# Patient Record
Sex: MALE | Race: WHITE | Employment: OTHER | URBAN - METROPOLITAN AREA
[De-identification: names, ages, dates, MRNs, and addresses within clinical notes are randomized per-mention and may not be internally consistent; named-entity substitution may affect disease eponyms.]

---

## 2017-12-03 PROBLEM — R06.00 DYSPNEA: Status: ACTIVE | Noted: 2017-12-03

## 2017-12-03 PROBLEM — R06.02 SHORTNESS OF BREATH: Status: ACTIVE | Noted: 2017-12-03

## 2017-12-03 PROBLEM — R06.02 SHORT OF BREATH ON EXERTION: Status: ACTIVE | Noted: 2017-12-03

## 2018-04-30 ENCOUNTER — OFFICE VISIT (OUTPATIENT)
Dept: CARDIOLOGY CLINIC | Age: 83
End: 2018-04-30
Payer: MEDICARE

## 2018-04-30 VITALS
HEART RATE: 68 BPM | SYSTOLIC BLOOD PRESSURE: 124 MMHG | RESPIRATION RATE: 16 BRPM | HEIGHT: 68 IN | WEIGHT: 149.3 LBS | DIASTOLIC BLOOD PRESSURE: 70 MMHG | BODY MASS INDEX: 22.63 KG/M2

## 2018-04-30 DIAGNOSIS — I25.10 CORONARY ARTERY DISEASE INVOLVING NATIVE CORONARY ARTERY OF NATIVE HEART WITHOUT ANGINA PECTORIS: ICD-10-CM

## 2018-04-30 DIAGNOSIS — I10 HYPERTENSION, UNSPECIFIED TYPE: ICD-10-CM

## 2018-04-30 DIAGNOSIS — I50.42 CHRONIC COMBINED SYSTOLIC AND DIASTOLIC CHF (CONGESTIVE HEART FAILURE) (HCC): Primary | ICD-10-CM

## 2018-04-30 DIAGNOSIS — I38 VALVULAR HEART DISEASE: ICD-10-CM

## 2018-04-30 DIAGNOSIS — I10 ESSENTIAL HYPERTENSION: ICD-10-CM

## 2018-04-30 PROCEDURE — 1036F TOBACCO NON-USER: CPT | Performed by: INTERNAL MEDICINE

## 2018-04-30 PROCEDURE — 93000 ELECTROCARDIOGRAM COMPLETE: CPT | Performed by: INTERNAL MEDICINE

## 2018-04-30 PROCEDURE — 1123F ACP DISCUSS/DSCN MKR DOCD: CPT | Performed by: INTERNAL MEDICINE

## 2018-04-30 PROCEDURE — G8598 ASA/ANTIPLAT THER USED: HCPCS | Performed by: INTERNAL MEDICINE

## 2018-04-30 PROCEDURE — 4040F PNEUMOC VAC/ADMIN/RCVD: CPT | Performed by: INTERNAL MEDICINE

## 2018-04-30 PROCEDURE — G8427 DOCREV CUR MEDS BY ELIG CLIN: HCPCS | Performed by: INTERNAL MEDICINE

## 2018-04-30 PROCEDURE — G8420 CALC BMI NORM PARAMETERS: HCPCS | Performed by: INTERNAL MEDICINE

## 2018-04-30 PROCEDURE — 99214 OFFICE O/P EST MOD 30 MIN: CPT | Performed by: INTERNAL MEDICINE

## 2018-07-12 ENCOUNTER — APPOINTMENT (OUTPATIENT)
Dept: GENERAL RADIOLOGY | Age: 83
DRG: 683 | End: 2018-07-12
Payer: MEDICARE

## 2018-07-12 ENCOUNTER — HOSPITAL ENCOUNTER (INPATIENT)
Age: 83
LOS: 6 days | Discharge: HOME | DRG: 683 | End: 2018-07-18
Attending: GENERAL PRACTICE | Admitting: GENERAL PRACTICE
Payer: MEDICARE

## 2018-07-12 DIAGNOSIS — R07.9 CHEST PAIN, UNSPECIFIED TYPE: ICD-10-CM

## 2018-07-12 DIAGNOSIS — N17.9 AKI (ACUTE KIDNEY INJURY) (HCC): Primary | ICD-10-CM

## 2018-07-12 DIAGNOSIS — I50.9 ACUTE CONGESTIVE HEART FAILURE, UNSPECIFIED HEART FAILURE TYPE (HCC): ICD-10-CM

## 2018-07-12 LAB
ALBUMIN SERPL-MCNC: 4 G/DL (ref 3.5–5.2)
ALP BLD-CCNC: 56 U/L (ref 40–129)
ALT SERPL-CCNC: 12 U/L (ref 0–40)
ANION GAP SERPL CALCULATED.3IONS-SCNC: 19 MMOL/L (ref 7–16)
APTT: 28.5 SEC (ref 24.5–35.1)
AST SERPL-CCNC: 16 U/L (ref 0–39)
BACTERIA: NORMAL /HPF
BASOPHILS ABSOLUTE: 0.03 E9/L (ref 0–0.2)
BASOPHILS RELATIVE PERCENT: 0.6 % (ref 0–2)
BILIRUB SERPL-MCNC: 1.2 MG/DL (ref 0–1.2)
BILIRUBIN URINE: NEGATIVE
BLOOD, URINE: ABNORMAL
BUN BLDV-MCNC: 57 MG/DL (ref 8–23)
CALCIUM SERPL-MCNC: 9.9 MG/DL (ref 8.6–10.2)
CHLORIDE BLD-SCNC: 100 MMOL/L (ref 98–107)
CHLORIDE URINE RANDOM: 60 MMOL/L
CLARITY: CLEAR
CO2: 20 MMOL/L (ref 22–29)
COLOR: YELLOW
CREAT SERPL-MCNC: 3 MG/DL (ref 0.7–1.2)
CREATININE URINE: 35 MG/DL (ref 40–278)
EKG ATRIAL RATE: 77 BPM
EKG P AXIS: 63 DEGREES
EKG P-R INTERVAL: 228 MS
EKG Q-T INTERVAL: 396 MS
EKG QRS DURATION: 104 MS
EKG QTC CALCULATION (BAZETT): 448 MS
EKG R AXIS: -50 DEGREES
EKG T AXIS: 87 DEGREES
EKG VENTRICULAR RATE: 77 BPM
EOSINOPHILS ABSOLUTE: 0.12 E9/L (ref 0.05–0.5)
EOSINOPHILS RELATIVE PERCENT: 2.2 % (ref 0–6)
GFR AFRICAN AMERICAN: 24
GFR NON-AFRICAN AMERICAN: 19 ML/MIN/1.73
GLUCOSE BLD-MCNC: 105 MG/DL (ref 74–109)
GLUCOSE URINE: NEGATIVE MG/DL
HCT VFR BLD CALC: 36.2 % (ref 37–54)
HEMOGLOBIN: 12.3 G/DL (ref 12.5–16.5)
IMMATURE GRANULOCYTES #: 0.02 E9/L
IMMATURE GRANULOCYTES %: 0.4 % (ref 0–5)
INR BLD: 1
KETONES, URINE: NEGATIVE MG/DL
LEUKOCYTE ESTERASE, URINE: NEGATIVE
LYMPHOCYTES ABSOLUTE: 1.48 E9/L (ref 1.5–4)
LYMPHOCYTES RELATIVE PERCENT: 27.6 % (ref 20–42)
MAGNESIUM: 2.4 MG/DL (ref 1.6–2.6)
MCH RBC QN AUTO: 29.5 PG (ref 26–35)
MCHC RBC AUTO-ENTMCNC: 34 % (ref 32–34.5)
MCV RBC AUTO: 86.8 FL (ref 80–99.9)
MONOCYTES ABSOLUTE: 0.38 E9/L (ref 0.1–0.95)
MONOCYTES RELATIVE PERCENT: 7.1 % (ref 2–12)
NEUTROPHILS ABSOLUTE: 3.33 E9/L (ref 1.8–7.3)
NEUTROPHILS RELATIVE PERCENT: 62.1 % (ref 43–80)
NITRITE, URINE: NEGATIVE
PDW BLD-RTO: 11.9 FL (ref 11.5–15)
PH UA: 7.5 (ref 5–9)
PLATELET # BLD: 206 E9/L (ref 130–450)
PMV BLD AUTO: 10.6 FL (ref 7–12)
POTASSIUM SERPL-SCNC: 5.3 MMOL/L (ref 3.5–5)
POTASSIUM SERPL-SCNC: 5.4 MMOL/L (ref 3.5–5)
POTASSIUM, UR: 33.8 MMOL/L
PRO-BNP: 1104 PG/ML (ref 0–450)
PROTEIN UA: NEGATIVE MG/DL
PROTHROMBIN TIME: 11.5 SEC (ref 9.3–12.4)
RBC # BLD: 4.17 E12/L (ref 3.8–5.8)
RBC UA: NORMAL /HPF (ref 0–2)
SODIUM BLD-SCNC: 139 MMOL/L (ref 132–146)
SODIUM URINE: 71 MMOL/L
SPECIFIC GRAVITY UA: 1.01 (ref 1–1.03)
TOTAL PROTEIN: 7.3 G/DL (ref 6.4–8.3)
TROPONIN: 0.05 NG/ML (ref 0–0.03)
UROBILINOGEN, URINE: 0.2 E.U./DL
WBC # BLD: 5.4 E9/L (ref 4.5–11.5)
WBC UA: NORMAL /HPF (ref 0–5)

## 2018-07-12 PROCEDURE — 93005 ELECTROCARDIOGRAM TRACING: CPT | Performed by: EMERGENCY MEDICINE

## 2018-07-12 PROCEDURE — 80053 COMPREHEN METABOLIC PANEL: CPT

## 2018-07-12 PROCEDURE — 83735 ASSAY OF MAGNESIUM: CPT

## 2018-07-12 PROCEDURE — 81001 URINALYSIS AUTO W/SCOPE: CPT

## 2018-07-12 PROCEDURE — 85025 COMPLETE CBC W/AUTO DIFF WBC: CPT

## 2018-07-12 PROCEDURE — 83880 ASSAY OF NATRIURETIC PEPTIDE: CPT

## 2018-07-12 PROCEDURE — 84132 ASSAY OF SERUM POTASSIUM: CPT

## 2018-07-12 PROCEDURE — 99285 EMERGENCY DEPT VISIT HI MDM: CPT

## 2018-07-12 PROCEDURE — 84133 ASSAY OF URINE POTASSIUM: CPT

## 2018-07-12 PROCEDURE — 82436 ASSAY OF URINE CHLORIDE: CPT

## 2018-07-12 PROCEDURE — 36415 COLL VENOUS BLD VENIPUNCTURE: CPT

## 2018-07-12 PROCEDURE — 84484 ASSAY OF TROPONIN QUANT: CPT

## 2018-07-12 PROCEDURE — 85730 THROMBOPLASTIN TIME PARTIAL: CPT

## 2018-07-12 PROCEDURE — 6360000002 HC RX W HCPCS: Performed by: GENERAL PRACTICE

## 2018-07-12 PROCEDURE — 6370000000 HC RX 637 (ALT 250 FOR IP): Performed by: EMERGENCY MEDICINE

## 2018-07-12 PROCEDURE — 99223 1ST HOSP IP/OBS HIGH 75: CPT | Performed by: INTERNAL MEDICINE

## 2018-07-12 PROCEDURE — 2580000003 HC RX 258: Performed by: EMERGENCY MEDICINE

## 2018-07-12 PROCEDURE — 84300 ASSAY OF URINE SODIUM: CPT

## 2018-07-12 PROCEDURE — 2140000000 HC CCU INTERMEDIATE R&B

## 2018-07-12 PROCEDURE — 82570 ASSAY OF URINE CREATININE: CPT

## 2018-07-12 PROCEDURE — 71045 X-RAY EXAM CHEST 1 VIEW: CPT

## 2018-07-12 PROCEDURE — 87205 SMEAR GRAM STAIN: CPT

## 2018-07-12 PROCEDURE — 85610 PROTHROMBIN TIME: CPT

## 2018-07-12 PROCEDURE — APPSS60 APP SPLIT SHARED TIME 46-60 MINUTES: Performed by: CLINICAL NURSE SPECIALIST

## 2018-07-12 PROCEDURE — 2580000003 HC RX 258: Performed by: INTERNAL MEDICINE

## 2018-07-12 PROCEDURE — 6370000000 HC RX 637 (ALT 250 FOR IP)

## 2018-07-12 PROCEDURE — 6370000000 HC RX 637 (ALT 250 FOR IP): Performed by: GENERAL PRACTICE

## 2018-07-12 RX ORDER — SODIUM CHLORIDE 9 MG/ML
INJECTION, SOLUTION INTRAVENOUS CONTINUOUS
Status: DISCONTINUED | OUTPATIENT
Start: 2018-07-12 | End: 2018-07-15

## 2018-07-12 RX ORDER — POTASSIUM CHLORIDE 20 MEQ/1
20 TABLET, EXTENDED RELEASE ORAL
Status: DISCONTINUED | OUTPATIENT
Start: 2018-07-13 | End: 2018-07-12

## 2018-07-12 RX ORDER — ASPIRIN 81 MG/1
81 TABLET ORAL 2 TIMES DAILY
Status: DISCONTINUED | OUTPATIENT
Start: 2018-07-12 | End: 2018-07-18 | Stop reason: HOSPADM

## 2018-07-12 RX ORDER — FUROSEMIDE 40 MG/1
TABLET ORAL
Status: COMPLETED
Start: 2018-07-12 | End: 2018-07-12

## 2018-07-12 RX ORDER — ISOSORBIDE MONONITRATE 30 MG/1
30 TABLET, EXTENDED RELEASE ORAL DAILY
Status: DISCONTINUED | OUTPATIENT
Start: 2018-07-12 | End: 2018-07-18 | Stop reason: HOSPADM

## 2018-07-12 RX ORDER — ISOSORBIDE MONONITRATE 30 MG/1
30 TABLET, EXTENDED RELEASE ORAL DAILY
COMMUNITY

## 2018-07-12 RX ORDER — LISINOPRIL 20 MG/1
20 TABLET ORAL EVERY 12 HOURS
Status: DISCONTINUED | OUTPATIENT
Start: 2018-07-12 | End: 2018-07-12

## 2018-07-12 RX ORDER — ATORVASTATIN CALCIUM 40 MG/1
40 TABLET, FILM COATED ORAL DAILY
Status: DISCONTINUED | OUTPATIENT
Start: 2018-07-12 | End: 2018-07-18 | Stop reason: HOSPADM

## 2018-07-12 RX ORDER — NITROGLYCERIN 0.4 MG/1
0.4 TABLET SUBLINGUAL EVERY 5 MIN PRN
Status: DISCONTINUED | OUTPATIENT
Start: 2018-07-12 | End: 2018-07-18 | Stop reason: HOSPADM

## 2018-07-12 RX ORDER — ACETAMINOPHEN 325 MG/1
650 TABLET ORAL EVERY 4 HOURS PRN
Status: DISCONTINUED | OUTPATIENT
Start: 2018-07-12 | End: 2018-07-18 | Stop reason: HOSPADM

## 2018-07-12 RX ORDER — SPIRONOLACTONE 25 MG/1
25 TABLET ORAL DAILY
Status: DISCONTINUED | OUTPATIENT
Start: 2018-07-12 | End: 2018-07-12

## 2018-07-12 RX ORDER — FUROSEMIDE 40 MG/1
40 TABLET ORAL ONCE
Status: DISCONTINUED | OUTPATIENT
Start: 2018-07-12 | End: 2018-07-12 | Stop reason: ALTCHOICE

## 2018-07-12 RX ORDER — FINASTERIDE 5 MG/1
5 TABLET, FILM COATED ORAL NIGHTLY
Status: DISCONTINUED | OUTPATIENT
Start: 2018-07-12 | End: 2018-07-18 | Stop reason: HOSPADM

## 2018-07-12 RX ORDER — SODIUM CHLORIDE 0.9 % (FLUSH) 0.9 %
10 SYRINGE (ML) INJECTION PRN
Status: DISCONTINUED | OUTPATIENT
Start: 2018-07-12 | End: 2018-07-18 | Stop reason: HOSPADM

## 2018-07-12 RX ORDER — METOPROLOL SUCCINATE 100 MG/1
100 TABLET, EXTENDED RELEASE ORAL DAILY
Status: DISCONTINUED | OUTPATIENT
Start: 2018-07-12 | End: 2018-07-18 | Stop reason: HOSPADM

## 2018-07-12 RX ORDER — SODIUM CHLORIDE 0.9 % (FLUSH) 0.9 %
10 SYRINGE (ML) INJECTION EVERY 12 HOURS SCHEDULED
Status: DISCONTINUED | OUTPATIENT
Start: 2018-07-12 | End: 2018-07-18 | Stop reason: HOSPADM

## 2018-07-12 RX ORDER — 0.9 % SODIUM CHLORIDE 0.9 %
500 INTRAVENOUS SOLUTION INTRAVENOUS ONCE
Status: COMPLETED | OUTPATIENT
Start: 2018-07-12 | End: 2018-07-12

## 2018-07-12 RX ORDER — ASPIRIN 81 MG/1
324 TABLET, CHEWABLE ORAL ONCE
Status: COMPLETED | OUTPATIENT
Start: 2018-07-12 | End: 2018-07-12

## 2018-07-12 RX ADMIN — SODIUM CHLORIDE 500 ML: 9 INJECTION, SOLUTION INTRAVENOUS at 10:18

## 2018-07-12 RX ADMIN — METOPROLOL SUCCINATE 100 MG: 100 TABLET, FILM COATED, EXTENDED RELEASE ORAL at 15:11

## 2018-07-12 RX ADMIN — FUROSEMIDE 40 MG: 40 TABLET ORAL at 15:10

## 2018-07-12 RX ADMIN — FINASTERIDE 5 MG: 5 TABLET, FILM COATED ORAL at 20:07

## 2018-07-12 RX ADMIN — ASPIRIN 81 MG: 81 TABLET, COATED ORAL at 15:10

## 2018-07-12 RX ADMIN — SODIUM CHLORIDE: 9 INJECTION, SOLUTION INTRAVENOUS at 15:11

## 2018-07-12 RX ADMIN — ATORVASTATIN CALCIUM 40 MG: 40 TABLET, FILM COATED ORAL at 15:11

## 2018-07-12 RX ADMIN — ENOXAPARIN SODIUM 30 MG: 30 INJECTION SUBCUTANEOUS at 15:10

## 2018-07-12 RX ADMIN — NITROGLYCERIN 0.4 MG: 0.4 TABLET SUBLINGUAL at 09:28

## 2018-07-12 RX ADMIN — NITROGLYCERIN 0.5 INCH: 20 OINTMENT TOPICAL at 09:28

## 2018-07-12 RX ADMIN — ASPIRIN 81 MG CHEWABLE TABLET 324 MG: 81 TABLET CHEWABLE at 09:27

## 2018-07-12 RX ADMIN — ISOSORBIDE MONONITRATE 30 MG: 30 TABLET ORAL at 15:11

## 2018-07-12 RX ADMIN — ASPIRIN 81 MG: 81 TABLET, COATED ORAL at 20:07

## 2018-07-12 ASSESSMENT — PAIN SCALES - GENERAL
PAINLEVEL_OUTOF10: 0
PAINLEVEL_OUTOF10: 0

## 2018-07-12 NOTE — ED PROVIDER NOTES
Clarity, UA Clear Clear    Glucose, Ur Negative Negative mg/dL    Bilirubin Urine Negative Negative    Ketones, Urine Negative Negative mg/dL    Specific Gravity, UA 1.010 1.005 - 1.030    Blood, Urine TRACE (A) Negative    pH, UA 7.5 5.0 - 9.0    Protein, UA Negative Negative mg/dL    Urobilinogen, Urine 0.2 <2.0 E.U./dL    Nitrite, Urine Negative Negative    Leukocyte Esterase, Urine Negative Negative   Creatinine, Random Urine   Result Value Ref Range    Creatinine, Ur 35 (L) 40 - 278 mg/dL   Eosinophil smear urine   Result Value Ref Range    Eosinophil, Urine 0 0 - 1 %   Potassium   Result Value Ref Range    Potassium 5.3 (H) 3.5 - 5.0 mmol/L   Microscopic Urinalysis   Result Value Ref Range    WBC, UA NONE 0 - 5 /HPF    RBC, UA 0-1 0 - 2 /HPF    Bacteria, UA NONE /HPF   CBC   Result Value Ref Range    WBC 4.4 (L) 4.5 - 11.5 E9/L    RBC 3.40 (L) 3.80 - 5.80 E12/L    Hemoglobin 10.2 (L) 12.5 - 16.5 g/dL    Hematocrit 30.5 (L) 37.0 - 54.0 %    MCV 89.7 80.0 - 99.9 fL    MCH 30.0 26.0 - 35.0 pg    MCHC 33.4 32.0 - 34.5 %    RDW 12.2 11.5 - 15.0 fL    Platelets 005 803 - 437 E9/L    MPV 10.2 7.0 - 12.0 fL   Magnesium   Result Value Ref Range    Magnesium 2.2 1.6 - 2.6 mg/dL   Phosphorus   Result Value Ref Range    Phosphorus 4.7 (H) 2.5 - 4.5 mg/dL   Basic Metabolic Panel   Result Value Ref Range    Sodium 139 132 - 146 mmol/L    Potassium 5.0 3.5 - 5.0 mmol/L    Chloride 103 98 - 107 mmol/L    CO2 25 22 - 29 mmol/L    Anion Gap 11 7 - 16 mmol/L    Glucose 90 74 - 109 mg/dL    BUN 57 (H) 8 - 23 mg/dL    CREATININE 2.9 (H) 0.7 - 1.2 mg/dL    GFR Non-African American 20 >=60 mL/min/1.73    GFR African American 25     Calcium 8.5 (L) 8.6 - 10.2 mg/dL   Basic Metabolic Panel   Result Value Ref Range    Sodium 142 132 - 146 mmol/L    Potassium 4.5 3.5 - 5.0 mmol/L    Chloride 107 98 - 107 mmol/L    CO2 23 22 - 29 mmol/L    Anion Gap 12 7 - 16 mmol/L    Glucose 87 74 - 109 mg/dL    BUN 46 (H) 8 - 23 mg/dL    CREATININE 2.2 (H) 0.7 - 1.2 mg/dL    GFR Non-African American 28 >=60 mL/min/1.73    GFR African American 34     Calcium 8.2 (L) 8.6 - 10.2 mg/dL   CBC   Result Value Ref Range    WBC 3.9 (L) 4.5 - 11.5 E9/L    RBC 3.36 (L) 3.80 - 5.80 E12/L    Hemoglobin 9.8 (L) 12.5 - 16.5 g/dL    Hematocrit 30.7 (L) 37.0 - 54.0 %    MCV 91.4 80.0 - 99.9 fL    MCH 29.2 26.0 - 35.0 pg    MCHC 31.9 (L) 32.0 - 34.5 %    RDW 12.0 11.5 - 15.0 fL    Platelets 657 457 - 083 E9/L    MPV 10.2 7.0 - 12.0 fL   CBC   Result Value Ref Range    WBC 5.5 4.5 - 11.5 E9/L    RBC 3.58 (L) 3.80 - 5.80 E12/L    Hemoglobin 10.6 (L) 12.5 - 16.5 g/dL    Hematocrit 32.1 (L) 37.0 - 54.0 %    MCV 89.7 80.0 - 99.9 fL    MCH 29.6 26.0 - 35.0 pg    MCHC 33.0 32.0 - 34.5 %    RDW 12.0 11.5 - 15.0 fL    Platelets 423 860 - 119 E9/L    MPV 10.2 7.0 - 12.0 fL   Basic Metabolic Panel   Result Value Ref Range    Sodium 142 132 - 146 mmol/L    Potassium 4.6 3.5 - 5.0 mmol/L    Chloride 109 (H) 98 - 107 mmol/L    CO2 21 (L) 22 - 29 mmol/L    Anion Gap 12 7 - 16 mmol/L    Glucose 91 74 - 109 mg/dL    BUN 33 (H) 8 - 23 mg/dL    CREATININE 1.7 (H) 0.7 - 1.2 mg/dL    GFR Non-African American 38 >=60 mL/min/1.73    GFR African American 45     Calcium 8.3 (L) 8.6 - 10.2 mg/dL   Basic Metabolic Panel   Result Value Ref Range    Sodium 141 132 - 146 mmol/L    Potassium 4.3 3.5 - 5.0 mmol/L    Chloride 109 (H) 98 - 107 mmol/L    CO2 21 (L) 22 - 29 mmol/L    Anion Gap 11 7 - 16 mmol/L    Glucose 88 74 - 109 mg/dL    BUN 26 (H) 8 - 23 mg/dL    CREATININE 1.5 (H) 0.7 - 1.2 mg/dL    GFR Non-African American 43 >=60 mL/min/1.73    GFR African American 52     Calcium 7.9 (L) 8.6 - 10.2 mg/dL   Basic Metabolic Panel   Result Value Ref Range    Sodium 135 132 - 146 mmol/L    Potassium 4.2 3.5 - 5.0 mmol/L    Chloride 102 98 - 107 mmol/L    CO2 21 (L) 22 - 29 mmol/L    Anion Gap 12 7 - 16 mmol/L    Glucose 92 74 - 109 mg/dL    BUN 24 (H) 8 - 23 mg/dL    CREATININE 1.4 (H) 0.7 - 1.2 mg/dL    GFR Non- 47 >=60 mL/min/1.73    GFR African American 57     Calcium 8.6 8.6 - 10.2 mg/dL   Basic Metabolic Panel   Result Value Ref Range    Sodium 136 132 - 146 mmol/L    Potassium 4.3 3.5 - 5.0 mmol/L    Chloride 103 98 - 107 mmol/L    CO2 23 22 - 29 mmol/L    Anion Gap 10 7 - 16 mmol/L    Glucose 85 74 - 109 mg/dL    BUN 23 8 - 23 mg/dL    CREATININE 1.3 (H) 0.7 - 1.2 mg/dL    GFR Non-African American 51 >=60 mL/min/1.73    GFR African American >60     Calcium 8.1 (L) 8.6 - 10.2 mg/dL   EKG 12 Lead   Result Value Ref Range    Ventricular Rate 77 BPM    Atrial Rate 77 BPM    P-R Interval 228 ms    QRS Duration 104 ms    Q-T Interval 396 ms    QTc Calculation (Bazett) 448 ms    P Axis 63 degrees    R Axis -50 degrees    T Axis 87 degrees       RADIOLOGY:  Interpreted by Radiologist.  US RETROPERITONEAL COMPLETE   Final Result   ALERT:  THIS IS AN ABNORMAL REPORT   1. Severely abnormal appearance of the bladder wall and internal   surface of the bladder with a mass measuring approximately 4.6 x 1.3   cm raising concern for a malignancy. Urological consultation and   direct examination and visualization and evaluation is recommended. 2. Right perinephric fluid. XR CHEST PORTABLE   Final Result   Opacities in the lung bases suggestive of atelectasis and/or pulmonary   scars. No pulmonary airspace consolidation. Very small bilateral   pleural effusions. EKG: This EKG is signed and interpreted by me. Rate: 77  Rhythm: Sinus, new 1st degree AV block and LAFB  Interpretation: Left axis, LVH, lateral ST depressions  Comparison: Lateral ST depressions are worse compared to prior    ------------------------- NURSING NOTES AND VITALS REVIEWED ---------------------------   The nursing notes within the ED encounter and vital signs as below have been reviewed.    /68   Pulse 98   Temp 98.9 °F (37.2 °C) (Oral)   Resp 18   Ht 5' 8\" (1.727 m)   Wt 148 lb 8 oz (67.4 kg)   SpO2 94%   BMI DISPOSITION ---------------------------------    IMPRESSION  1. UZIEL (acute kidney injury) ([facility]    2. Chest pain, unspecified type    3. Acute congestive heart failure, unspecified heart failure type ([facility]        DISPOSITION  Disposition: Admit to telemetry  Patient condition is stable    7/12/18, 9:23 AM.    This note is prepared by Eboni Molina, acting as Scribe for Tee Gaston DO. Tee Gaston DO:  The scribe's documentation has been prepared under my direction and personally reviewed by me in its entirety. I confirm that the note above accurately reflects all work, treatment, procedures, and medical decision making performed by me.              Tee Gaston DO  09/01/18 [id number]

## 2018-07-12 NOTE — CONSULTS
Consult Note  NEPHROLOGY    Reason for Consult:  Evaluate Acute Kidney Injury     Requesting Physician:  Dr Thedford Habermann     Chief Complaint:  Admitted w/ CP and SOB    History Obtained From:  patient, family member - (Daughter) and electronic medical record    History of Present Ilness: This is a 80 y.o.  male with no documented Hx of renal disease (baseline scr 1.1) who now presents to ED for chest pain and SOB, beginning this AM. Pt has chronically had chest pain and SOB for the past few months, he has a Hx of CHF. This AM his SOB became worse and he began having chest pain in his car after driving to breakfast.   Vitals upon arrival included /89   Pulse 89   Temp 96.8 °F (36 °C) (Temporal)   Resp 16   Wt 150 lb (68 kg)   SpO2 98%   BMI 22.81 kg/m². Pertinent labs included WBC 5.4 and Hgb 12.3. Na 139, K 5.4, Chl 100, CO2  20, BUN 57 and creat 3.0. Patient was subsequently admitted for further work up. Currently upon exam pt is in no acute distress: he denies any CP or SOB at rest.  No N/V. Family states recent diarrhea this past week which resolved after few days. He denies any hematuria or dysuria; does note that he wears diapers for incontinence.        Past Medical History:        Diagnosis Date    Cancer [facility])     colon    Hyperlipidemia     Hypertension        Past Surgical History:        Procedure Laterality Date    COLON SURGERY      ECHOCARDIOGRAM COMPLETE 2D W DOPPLER W COLOR  10/8/2012         HERNIA REPAIR         Current Medications:    Current Facility-Administered Medications: nitroGLYCERIN (NITROSTAT) SL tablet 0.4 mg, 0.4 mg, Sublingual, Q5 Min PRN  sodium chloride flush 0.9 % injection 10 mL, 10 mL, Intravenous, 2 times per day  sodium chloride flush 0.9 % injection 10 mL, 10 mL, Intravenous, PRN  acetaminophen (TYLENOL) tablet 650 mg, 650 mg, Oral, Q4H PRN  aspirin EC tablet 81 mg, 81 mg, Oral, BID  atorvastatin (LIPITOR) tablet 40 mg, 40 mg, Oral, Daily  finasteride (PROSCAR) tablet 5 mg, 5 mg, Oral, Nightly  furosemide (LASIX) tablet 40 mg, 40 mg, Oral, Once  isosorbide mononitrate (IMDUR) extended release tablet 30 mg, 30 mg, Oral, Daily  metoprolol succinate (TOPROL XL) extended release tablet 100 mg, 100 mg, Oral, Daily  enoxaparin (LOVENOX) injection 30 mg, 30 mg, Subcutaneous, Daily    Allergies:  Patient has no known allergies. Social History:  reports that he has never smoked. He has never used smokeless tobacco. He reports that he does not drink alcohol or use drugs. Family History:     History reviewed. No pertinent family history. Review of Systems:       Pertinent positives stated above in HPI. All other systems were reviewed and were negative.     Physical exam:   Constitutional:  VITALS:  BP (!) 145/75   Pulse 76   Temp 97.7 °F (36.5 °C)   Resp 16   Ht 5' 8\" (1.727 m)   Wt 141 lb 3.2 oz (64 kg)   SpO2 100%   BMI 21.47 kg/m²   CURRENT TEMPERATURE:  Temp: 97.7 °F (36.5 °C)  CURRENT RESPIRATORY RATE:  Resp: 16  CURRENT PULSE:  Pulse: 76  CURRENT BLOOD PRESSURE:  BP: (!) 145/75  24HR BLOOD PRESSURE RANGE:  Systolic (86VOJ), AQT:709 , Min:126 , PX   ; Diastolic (84TZX), QZJ:38, Min:75, Max:95    24HR INTAKE/OUTPUT:    Intake/Output Summary (Last 24 hours) at 18 1337  Last data filed at 18 1330   Gross per 24 hour   Intake              620 ml   Output                0 ml   Net              620 ml     Gen: alert, awake, nad  Skin: no rash, turgor wnl  Heent:  eomi, mmm  Neck: no bruits or jvd noted  Cardiovascular:  S1, S2 without m/r/g  Respiratory: diminished in bases    Abdomen:  +bs, soft, nt, nd  Ext: no edema   Psychiatric: mood and affect appropriate  Musculoskeletal:  Rom, muscular strength intact    DATA:      CBC:   Lab Results   Component Value Date    WBC 5.4 2018    RBC 4.17 2018    HGB 12.3 2018    HCT 36.2 2018    MCV 86.8 2018    MCH 29.5 2018    MCHC 34.0 2018 RDW 11.9 2018     2018    MPV 10.6 2018     BMP:    Lab Results   Component Value Date     2018    K 5.4 2018     2018    CO2 20 2018    BUN 57 2018    LABALBU 4.0 2018    LABALBU 4.3 2011    CREATININE 3.0 2018    CALCIUM 9.9 2018    GFRAA 24 2018    LABGLOM 19 2018    GLUCOSE 105 2018    GLUCOSE 86 2011       RAD:  Xr Chest Portable    Result Date: 2018  Patient MRN:  [id number] : 1922 Age: 80 years Gender: Male Order Date:  2018 9:30 AM Exam: XR CHEST PORTABLE Number of views:  1 portable AP upright view of the chest Indication: Chest pain. Shortness of breath. Comparison: 12/3/2017 FINDINGS:  Compared to the prior study, there is improved aeration of the lungs. No pulmonary airspace consolidation is seen. There are reticular and streaky opacities in the lung bases, suggestive of atelectasis and/or pulmonary scars. There appear to be small residual bilateral pleural effusions. There is no pneumothorax. Opacities in the lung bases suggestive of atelectasis and/or pulmonary scars. No pulmonary airspace consolidation. Very small bilateral pleural effusions.         Assessment/Plan    1) Acute Kidney Injury (Baseline scr 1.1 in Dec 2017): now elevated scr in setting of recent diarrhea and diuretic use in the home setting - now admitted w/ CP - will hold diuretics - start gentle IV fluids - check urine indices - bladder scan for PVR - strict I/Os and daily labs    2) Hyperkalemia: was on lisinopril 20 BID in the home setting (now held) - repeat K+ at     3) CP: w/ SOB - for cardiac work up    4) HTN: follow w/ acei and lasix on hold     5) Hx CA of Colon        Thank you for allowing us to participate in care of Mr Romelia Crocker, APRN - CNP  2018  1:37 PM     Patient seen and examined all key components of the physical performed independently , case discussed with

## 2018-07-12 NOTE — ED NOTES
Bed: 18A-18  Expected date:   Expected time:   Means of arrival:   Comments:  ems     Cody Logan RN  07/12/18 2114

## 2018-07-12 NOTE — CONSULTS
ventricular systolic function.  Ejection fraction is visually estimated at 25-30%.  Normal right ventricular size and function (TAPSE 2.4 cm).  There is doppler evidence of stage II diastolic dysfunction.  Mild mitral regurgitation.  Mild-moderate aortic regurgitation.  Mild tricuspid regurgitation.  PASP is estimated at 46 mmHg.  Mild pulmonic regurgitation.  Left-sided pleural effusion. 4. Hypoxic respiratory failure in 10/2012: felt to be multifactorial (pneumonia, heart failure, elevated cardiac enzymes - peak troponin 1.55)  5. Hypertension  6. Hyperlipidemia  7. Chronic left anterior fascicular block  8. Chronic kidney disease  9. BPH  10. Colon cancer s/p right hemicolectomy and chemotherapy    Medications Prior to Admit:  Prior to Admission medications    Medication Sig Start Date End Date Taking? Authorizing Provider   isosorbide mononitrate (IMDUR) 30 MG extended release tablet Take 30 mg by mouth daily   Yes Historical Provider, MD   aspirin EC 81 MG EC tablet Take 81 mg by mouth 2 times daily    Yes Historical Provider, MD   metoprolol succinate (TOPROL XL) 100 MG extended release tablet Take 1 tablet by mouth daily 12/8/17  Yes Hardeep Fermin, DO   spironolactone (ALDACTONE) 25 MG tablet Take 1 tablet by mouth daily 12/8/17  Yes Hua Fortune, DO   furosemide (LASIX) 20 MG tablet Take 40 mg by mouth once    Yes Historical Provider, MD   finasteride (PROSCAR) 5 MG tablet Take 5 mg by mouth nightly    Yes Historical Provider, MD   Multiple Vitamin (MULTIVITAMIN PO) Take 1 tablet by mouth daily    Yes Historical Provider, MD   atorvastatin (LIPITOR) 40 MG tablet Take 1 tablet by mouth daily. 10/13/12  Yes Hua Fortune, DO   lisinopril (PRINIVIL;ZESTRIL) 20 MG tablet Take 1 tablet by mouth every 12 hours. 10/13/12  Yes Hua Fortune, DO   potassium chloride SA (K-DUR;KLOR-CON) 20 MEQ tablet Take 1 tablet by mouth daily (with breakfast).  10/13/12  Yes Hardeep Fermin, DO       Current Medications:    Current Facility-Administered Medications: nitroGLYCERIN (NITROSTAT) SL tablet 0.4 mg, 0.4 mg, Sublingual, Q5 Min PRN  sodium chloride flush 0.9 % injection 10 mL, 10 mL, Intravenous, 2 times per day  sodium chloride flush 0.9 % injection 10 mL, 10 mL, Intravenous, PRN  acetaminophen (TYLENOL) tablet 650 mg, 650 mg, Oral, Q4H PRN  aspirin EC tablet 81 mg, 81 mg, Oral, BID  atorvastatin (LIPITOR) tablet 40 mg, 40 mg, Oral, Daily  finasteride (PROSCAR) tablet 5 mg, 5 mg, Oral, Nightly  furosemide (LASIX) tablet 40 mg, 40 mg, Oral, Once  isosorbide mononitrate (IMDUR) extended release tablet 30 mg, 30 mg, Oral, Daily  metoprolol succinate (TOPROL XL) extended release tablet 100 mg, 100 mg, Oral, Daily  enoxaparin (LOVENOX) injection 30 mg, 30 mg, Subcutaneous, Daily  0.9 % sodium chloride infusion, , Intravenous, Continuous    Allergies:  Patient has no known allergies. Social History: There is no history of tobacco, alcohol, or illicit drug use. He recently began to use a walker due to weakness and falls. Family History:  Noncontributory given his age     Review of Systems:     · Constitutional: Denies fatigue, fevers, chills or night sweats. Positive for weakness. · Eyes: Denies visual changes or drainage  · ENT: Denies headaches, epistaxis, or bleeding gums. · Cardiovascular: Chest pain with palpation as described above. Denies exertional chest pain. Denies palpitations or lower extremity swelling. · Respiratory: He felt short of breath this morning when walking from his car but denies other dyspnea, orthopnea, or PND. · Gastrointestinal: Positive for diarrhea. Denies nausea, vomiting, abdominal pain. Positive for loss of appetite. · Genitourinary: Denies urgency, dysuria or hematuria. Positive for incontinence. · Musculoskeletal: Positive for falls and weakness in legs. · Integumentary: Denies rash, hives or pruritis   · Neurological: Denies dizziness, syncope, or seizures.  No numbness or Pulse 76   Temp 97.7 °F (36.5 °C)   Resp 16   Ht 5' 8\" (1.727 m)   Wt 141 lb 3.2 oz (64 kg)   SpO2 100%   BMI 21.47 kg/m²   Appearance: Awake, no acute respiratory distress   Skin: Intact, stasis dermatitis changes  Head: Normocephalic, atraumatic   Eyes: EOMI, no conjunctival erythema   ENMT: No pharyngeal erythema, MMM, no rhinorrhea   Neck: Supple, no apparent elevated JVP, no carotid bruits   Lungs: Decreased BS B/L, no wheezing  Cardiac: Regular rate and rhythm, +S1S2, 2/6 HSM at apex   Abdomen: Soft, nontender, +bowel sounds   Extremities: Moves all extremities x 4, no LE edema  Neurologic: No focal motor deficits apparent, normal mood and affect     Laboratory Tests:  Lab Results   Component Value Date    WBC 5.4 07/12/2018    HGB 12.3 (L) 07/12/2018    HCT 36.2 (L) 07/12/2018    MCV 86.8 07/12/2018     07/12/2018     Lab Results   Component Value Date    CREATININE 3.0 (H) 07/12/2018    BUN 57 (H) 07/12/2018     07/12/2018    K 5.4 (H) 07/12/2018     07/12/2018    CO2 20 (L) 07/12/2018     Lab Results   Component Value Date    MG 2.4 07/12/2018     Lab Results   Component Value Date    CKTOTAL 102 12/03/2017    CKMB 5.3 12/03/2017    TROPONINI 0.05 (H) 07/12/2018    TROPONINI 0.07 (H) 12/03/2017    TROPONINI 0.59 (H) 10/09/2012     Lab Results   Component Value Date    TSH 0.487 10/08/2012     Recent Labs      07/12/18   0930   PROBNP  1,104*     Telemetry: SR, rate 70's     Echocardiogram: 10/8/12   Left ventricle is moderately enlarged. Severely reduced left ventricular systolic function. Ejection fraction is visually estimated at 25 +/- 5%. Elevated E/E' suggestive of elevated left atrial pressure. Normal right ventricular size and function. Moderately calcified mitral valve leaflets. Mild mitral regurgitation is present. Moderate fibrocalcific changes of the aortic valve leaflets. Mild-to-moderate aortic regurgitation is noted. Trace tricuspid regurgitation.

## 2018-07-13 ENCOUNTER — APPOINTMENT (OUTPATIENT)
Dept: ULTRASOUND IMAGING | Age: 83
DRG: 683 | End: 2018-07-13
Payer: MEDICARE

## 2018-07-13 LAB
ANION GAP SERPL CALCULATED.3IONS-SCNC: 11 MMOL/L (ref 7–16)
BUN BLDV-MCNC: 57 MG/DL (ref 8–23)
CALCIUM SERPL-MCNC: 8.5 MG/DL (ref 8.6–10.2)
CHLORIDE BLD-SCNC: 103 MMOL/L (ref 98–107)
CO2: 25 MMOL/L (ref 22–29)
CREAT SERPL-MCNC: 2.9 MG/DL (ref 0.7–1.2)
EOSINOPHIL, URINE: 0 % (ref 0–1)
GFR AFRICAN AMERICAN: 25
GFR NON-AFRICAN AMERICAN: 20 ML/MIN/1.73
GLUCOSE BLD-MCNC: 90 MG/DL (ref 74–109)
HCT VFR BLD CALC: 30.5 % (ref 37–54)
HEMOGLOBIN: 10.2 G/DL (ref 12.5–16.5)
MAGNESIUM: 2.2 MG/DL (ref 1.6–2.6)
MCH RBC QN AUTO: 30 PG (ref 26–35)
MCHC RBC AUTO-ENTMCNC: 33.4 % (ref 32–34.5)
MCV RBC AUTO: 89.7 FL (ref 80–99.9)
PDW BLD-RTO: 12.2 FL (ref 11.5–15)
PHOSPHORUS: 4.7 MG/DL (ref 2.5–4.5)
PLATELET # BLD: 154 E9/L (ref 130–450)
PMV BLD AUTO: 10.2 FL (ref 7–12)
POTASSIUM SERPL-SCNC: 5 MMOL/L (ref 3.5–5)
RBC # BLD: 3.4 E12/L (ref 3.8–5.8)
SODIUM BLD-SCNC: 139 MMOL/L (ref 132–146)
WBC # BLD: 4.4 E9/L (ref 4.5–11.5)

## 2018-07-13 PROCEDURE — 6370000000 HC RX 637 (ALT 250 FOR IP): Performed by: GENERAL PRACTICE

## 2018-07-13 PROCEDURE — 85027 COMPLETE CBC AUTOMATED: CPT

## 2018-07-13 PROCEDURE — 76770 US EXAM ABDO BACK WALL COMP: CPT

## 2018-07-13 PROCEDURE — 83735 ASSAY OF MAGNESIUM: CPT

## 2018-07-13 PROCEDURE — 99233 SBSQ HOSP IP/OBS HIGH 50: CPT | Performed by: INTERNAL MEDICINE

## 2018-07-13 PROCEDURE — 80048 BASIC METABOLIC PNL TOTAL CA: CPT

## 2018-07-13 PROCEDURE — 2140000000 HC CCU INTERMEDIATE R&B

## 2018-07-13 PROCEDURE — 84100 ASSAY OF PHOSPHORUS: CPT

## 2018-07-13 PROCEDURE — 36415 COLL VENOUS BLD VENIPUNCTURE: CPT

## 2018-07-13 PROCEDURE — 6360000002 HC RX W HCPCS: Performed by: GENERAL PRACTICE

## 2018-07-13 RX ADMIN — FINASTERIDE 5 MG: 5 TABLET, FILM COATED ORAL at 21:10

## 2018-07-13 RX ADMIN — ENOXAPARIN SODIUM 30 MG: 30 INJECTION SUBCUTANEOUS at 08:20

## 2018-07-13 RX ADMIN — ASPIRIN 81 MG: 81 TABLET, COATED ORAL at 21:10

## 2018-07-13 RX ADMIN — ASPIRIN 81 MG: 81 TABLET, COATED ORAL at 08:19

## 2018-07-13 RX ADMIN — ATORVASTATIN CALCIUM 40 MG: 40 TABLET, FILM COATED ORAL at 08:19

## 2018-07-13 RX ADMIN — METOPROLOL SUCCINATE 100 MG: 100 TABLET, FILM COATED, EXTENDED RELEASE ORAL at 08:19

## 2018-07-13 RX ADMIN — ISOSORBIDE MONONITRATE 30 MG: 30 TABLET ORAL at 08:19

## 2018-07-13 ASSESSMENT — PAIN SCALES - GENERAL
PAINLEVEL_OUTOF10: 0

## 2018-07-13 NOTE — PLAN OF CARE
Problem: Risk for Impaired Skin Integrity  Goal: Tissue integrity - skin and mucous membranes  Structural intactness and normal physiological function of skin and  mucous membranes.    Outcome: Met This Shift      Problem: Falls - Risk of:  Goal: Will remain free from falls  Will remain free from falls   Outcome: Met This Shift

## 2018-07-13 NOTE — PROGRESS NOTES
content appropriate      Assessment/Plan:   1) Acute Kidney Injury (Baseline scr 1.1 in Dec 2017): now elevated scr in setting of recent diarrhea and diuretic use in the home setting - now admitted w/ CP - will hold diuretics - on gentle IV fluids - renal US pending - bladder scan for PVR - strict I/Os and daily labs     2) Hyperkalemia: was on lisinopril 20 BID in the home setting (now held) - repeat K+ improving      3) CP: w/ SOB - for cardiac work up     4) HTN: follow w/ acei and lasix on hold      5) Hx CA of Colon           Parul Grandchild, APRN - CNP     Patient seen and examined all key components of the physical performed independently , case discussed with NP, all pertinent labs and radiologic tests personally reviewed agree with above.     -Renal US demonstrates no hydronephrosis; ?bladder lesion; suggested CT to family they are not sure they want to proceed with test  Bernabe Gee MD

## 2018-07-13 NOTE — PROGRESS NOTES
Mild-to-moderate aortic regurgitation is noted. Trace tricuspid regurgitation. PASP is estimated at 21 mmHg.     Echocardiogram: 12/4/17 ([place])   Severely reduced left ventricular systolic function.   Ejection fraction is visually estimated at 25-30%.   Normal right ventricular size and function (TAPSE 2.4 cm).   There is doppler evidence of stage II diastolic dysfunction.   Mild mitral regurgitation.   Mild-moderate aortic regurgitation.   Mild tricuspid regurgitation.   PASP is estimated at 46 mmHg.   Mild pulmonic regurgitation.   Left-sided pleural effusion.     ASSESSMENT / PLAN:  1. Chronic systolic/diastolic CHF (hospitlaized in 12/2017 for CHF exacerbation -- clinically improved)  2. Acute on CKD -- Cr 1.1 in 12/2017 --> Cr 3.0 this admission with K 5.4 --> repeat Cr 2.9 and K 5.0  3. CAD -- medically managed  4. History of hypoxemic respiratory failure in 10/2012 -- likely multifactorial (PNA, heart failure, elevated cardiac enzymes -- he opted for noninvasive cardiac management at that time)  5. HTN - typically controlled (SBP this admission 120's-140's)  6. HLD - on statin  7. Mild-moderate valve disease in 10/2012 (similar findings on 12/4/17 echocardiogram)  8. Chronic LAFB     Plan:  1. Hold lasix, aldactone, ACE-I, and K supplement  2. Renal following / gentle hydration / follow-up repeat renal function and electrolytes  3. Continue ASA, statin, Toprol XL, and imdur  4. Results of repeat echocardiogram (12/4/17) outlined above --> similar findings to 2012 echcoardiogram  5. Continue with conservative/non-invasive management per patient request  6. Telemetry reviewed (SB/SR)     Tiny Garza MD  [facility]) Cardiology

## 2018-07-14 LAB
ANION GAP SERPL CALCULATED.3IONS-SCNC: 12 MMOL/L (ref 7–16)
BUN BLDV-MCNC: 46 MG/DL (ref 8–23)
CALCIUM SERPL-MCNC: 8.2 MG/DL (ref 8.6–10.2)
CHLORIDE BLD-SCNC: 107 MMOL/L (ref 98–107)
CO2: 23 MMOL/L (ref 22–29)
CREAT SERPL-MCNC: 2.2 MG/DL (ref 0.7–1.2)
GFR AFRICAN AMERICAN: 34
GFR NON-AFRICAN AMERICAN: 28 ML/MIN/1.73
GLUCOSE BLD-MCNC: 87 MG/DL (ref 74–109)
HCT VFR BLD CALC: 30.7 % (ref 37–54)
HEMOGLOBIN: 9.8 G/DL (ref 12.5–16.5)
MCH RBC QN AUTO: 29.2 PG (ref 26–35)
MCHC RBC AUTO-ENTMCNC: 31.9 % (ref 32–34.5)
MCV RBC AUTO: 91.4 FL (ref 80–99.9)
PDW BLD-RTO: 12 FL (ref 11.5–15)
PLATELET # BLD: 150 E9/L (ref 130–450)
PMV BLD AUTO: 10.2 FL (ref 7–12)
POTASSIUM SERPL-SCNC: 4.5 MMOL/L (ref 3.5–5)
RBC # BLD: 3.36 E12/L (ref 3.8–5.8)
SODIUM BLD-SCNC: 142 MMOL/L (ref 132–146)
WBC # BLD: 3.9 E9/L (ref 4.5–11.5)

## 2018-07-14 PROCEDURE — 2580000003 HC RX 258: Performed by: INTERNAL MEDICINE

## 2018-07-14 PROCEDURE — 36415 COLL VENOUS BLD VENIPUNCTURE: CPT

## 2018-07-14 PROCEDURE — 80048 BASIC METABOLIC PNL TOTAL CA: CPT

## 2018-07-14 PROCEDURE — 6370000000 HC RX 637 (ALT 250 FOR IP): Performed by: GENERAL PRACTICE

## 2018-07-14 PROCEDURE — 6360000002 HC RX W HCPCS: Performed by: GENERAL PRACTICE

## 2018-07-14 PROCEDURE — 85027 COMPLETE CBC AUTOMATED: CPT

## 2018-07-14 PROCEDURE — 2580000003 HC RX 258: Performed by: GENERAL PRACTICE

## 2018-07-14 PROCEDURE — 2140000000 HC CCU INTERMEDIATE R&B

## 2018-07-14 RX ADMIN — ISOSORBIDE MONONITRATE 30 MG: 30 TABLET ORAL at 08:24

## 2018-07-14 RX ADMIN — SODIUM CHLORIDE: 9 INJECTION, SOLUTION INTRAVENOUS at 09:36

## 2018-07-14 RX ADMIN — ENOXAPARIN SODIUM 30 MG: 30 INJECTION SUBCUTANEOUS at 08:25

## 2018-07-14 RX ADMIN — METOPROLOL SUCCINATE 100 MG: 100 TABLET, FILM COATED, EXTENDED RELEASE ORAL at 08:25

## 2018-07-14 RX ADMIN — ASPIRIN 81 MG: 81 TABLET, COATED ORAL at 08:25

## 2018-07-14 RX ADMIN — ATORVASTATIN CALCIUM 40 MG: 40 TABLET, FILM COATED ORAL at 08:25

## 2018-07-14 RX ADMIN — FINASTERIDE 5 MG: 5 TABLET, FILM COATED ORAL at 20:34

## 2018-07-14 RX ADMIN — ASPIRIN 81 MG: 81 TABLET, COATED ORAL at 20:34

## 2018-07-14 RX ADMIN — Medication 10 ML: at 20:34

## 2018-07-14 ASSESSMENT — PAIN SCALES - GENERAL
PAINLEVEL_OUTOF10: 0

## 2018-07-14 NOTE — PROGRESS NOTES
wheezes  Gastrointestinal:  Soft, nontender, nondistended, bowel sounds x 4  Ext: no edema  Skin: dry, no rash  Neuro: aaox3    MEDS (scheduled):    sodium chloride flush  10 mL Intravenous 2 times per day    aspirin EC  81 mg Oral BID    atorvastatin  40 mg Oral Daily    finasteride  5 mg Oral Nightly    isosorbide mononitrate  30 mg Oral Daily    metoprolol succinate  100 mg Oral Daily    enoxaparin  30 mg Subcutaneous Daily       MEDS (infusions):   sodium chloride 50 mL/hr at 07/14/18 0936       MEDS (prn):  nitroGLYCERIN, sodium chloride flush, acetaminophen    DATA:    Recent Labs      07/12/18   0930 07/13/18   0539  07/14/18   0457   WBC  5.4  4.4*  3.9*   HGB  12.3*  10.2*  9.8*   HCT  36.2*  30.5*  30.7*   MCV  86.8  89.7  91.4   PLT  206  154  150     Recent Labs      07/12/18 0930  07/12/18 2021 07/13/18   0539  07/14/18   0457   NA  139   --   139  142   K  5.4*  5.3*  5.0  4.5   CL  100   --   103  107   CO2  20*   --   25  23   BUN  57*   --   57*  46*   CREATININE  3.0*   --   2.9*  2.2*   LABGLOM  19   --   20  28   GLUCOSE  105   --   90  87   CALCIUM  9.9   --   8.5*  8.2*   ALT  12   --    --    --    AST  16   --    --    --    BILITOT  1.2   --    --    --    ALKPHOS  56   --    --    --    MG  2.4   --   2.2   --    PHOS   --    --   4.7*   --        Lab Results   Component Value Date    LABALBU 4.0 07/12/2018    LABALBU 3.0 (L) 12/04/2017    LABALBU 3.8 12/03/2017     Lab Results   Component Value Date    TSH 0.487 10/08/2012     No results found for: PHART, PO2ART, LUM4PET    Iron Studies: No results found for: IRON, TIBC, FERRITIN      IMPRESSION/RECOMMENDATIONS:      1) Acute Kidney Injury (Baseline scr 1.1 in Dec 2017): now elevated scr in setting of recent diarrhea and diuretic use in the home setting - now admitted w/ CP - will hold diuretics - on gentle IV fluids - strict I/Os and daily labs, renal fn improving     2) Hyperkalemia: was on lisinopril 20 BID in the home

## 2018-07-14 NOTE — H&P
[address]                   [address] [facility], [address] [facility]                               HISTORY AND PHYSICAL    PATIENT NAME: Megan Lock                     :        1922  MED REC NO:   [id number]                            ROOM:       6518  ACCOUNT NO:   [de-identified]                           ADMIT DATE: 2018  PROVIDER:     Valente Weems DO    HISTORY OF PRESENT ILLNESS:  This is a 59-year-old white male well known to  me. Has a long-standing history of coronary artery disease, decompensated  chronic systolic congestive heart failure, and chronic kidney disease. He  had been having some chest pains intermittently and some shortness of  breath and EKG was done. It showed no acute changes. We empirically  started the patient on some long-acting nitrates as he adamantly refuses  any type of invasive, interventional type of therapy because of his age and  I thought that was reasonable. He did have some diarrhea as well and he  had been out in a hot weather and continuing to take his medications. He  is on beta blockers, ACE inhibitors, and diuretics and he presented to the  emergency room again short of breath and he was noted to be in acute renal  failure on his chronic kidney disease. Chest x-ray showed possibility of  mild left-sided pleural effusion. He was subsequently admitted for gentle  hydration, Cardiovascular and Renal consult. MEDICATIONS:  Recent current medications include potassium, lisinopril,  atorvastatin, vitamins, Proscar, Lasix, spironolactone, metoprolol,  aspirin, and Imdur. PAST MEDICAL HISTORY:  Significant for congestive heart failure, chronic  systolic in nature; hypertension; hypoxia; left anterior fascicular block;  previous history of pneumonia; prostatic hypertrophy; valvular heart  disease; acute-on-chronic kidney disease; and chronic gastric duodenal  ulcers.     PAST SURGICAL HISTORY:  Significant for colon surgery, hernia repair, and  echocardiograms in the past.    ALLERGIES:  He has no known allergies. SOCIAL HISTORY:  The patient is a lifelong nonsmoker, nondrinker. Denies  use of narcotic drugs or alcoholic beverages. FAMILY HISTORY:  Noted, but is not significant, his advanced age. REVIEW OF SYSTEMS:  HEENT:  He does complain of some lightheadedness. No cephalgia. No  ringing in the ears, hearing loss, visual disturbances, bloody nose,  difficulty swallowing, or hoarseness of his voice. CARDIOPULMONARY:  He does have some palpitations, some orthopnea, some  vague discomfort in his chest consistent with angina. There is no cough,  wheeze, hemoptysis, or pleuritic pain. GI:  No nausea, vomiting, diarrhea, constipation, blood in his stool, or  recent weight change. :  He does have urgency, frequency, and nocturia. SKIN:  Very thin. He has multiple ecchymotic areas, but no urticaria,  pruritus, or growths. 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 ENDOCRINE SYSTEM:  Negative for diabetes or thyroid disorder. PSYCHIATRIC SYSTEM:  Negative for anxiety or depression. NEUROLOGIC SYSTEM:  Negative for CVA, seizures, TIAs, and tremors. PHYSICAL EXAMINATION:  GENERAL:  Shows this to be a 61-year-old white male in moderate distress  with the above complaints. HEAD, EYES, EARS, NOSE, AND THROAT:  Examination shows the head is  normocephalic, atraumatic. Pupils are equal and reactive to light and  accommodation. Extraocular eye muscles are intact. Tympanic membranes are  clear. Ear canals are patent. Oral mucosa is pink and moist.  NECK:  Veins are distended. No thyromegaly or mass. No bruits. CHEST:  Symmetrical.  HEART:  Regular rate and rhythm. S3 gallop, murmur noted. LUNGS:  Diminished breath sounds. Basilar crackles, left worse than right. ABDOMEN:  Soft, nontender, and nondistended. Bowel sounds are present in  all four quadrants.   EXTERNAL GENITALIA: Intact. EXTREMITIES:  Peripheral pulses are diminished. Legs without edema. SPINE:  Shows physiologic curve. LABORATORY DATA:  He had some labs that showed a BUN of 57 and creatinine  of 3, significantly elevated from his baseline creatinine of 1.1. ProBNP  was elevated at 1104. Troponin at 0.05. EKG:  Nonspecific changes. PLAN:  The patient is going to be admitted. Gentle hydration. We will get  Renal consult and Cardiovascular consult. Further orders will be written  for as the clinical course dictates.         Cl Sal DO    D: 07/14/2018 14:47:14       T: 07/14/2018 14:48:21     [email or web address]  Job#: [id number]     Doc#: [id number]    CC:

## 2018-07-14 NOTE — PROGRESS NOTES
Call back received from [address] [place] [address] on behalf of Dr. Yasmany Fernandez for urology consult. Diagnostic information and current patient status reviewed. Urology will see the patient tomorrow.

## 2018-07-15 LAB
ANION GAP SERPL CALCULATED.3IONS-SCNC: 12 MMOL/L (ref 7–16)
BUN BLDV-MCNC: 33 MG/DL (ref 8–23)
CALCIUM SERPL-MCNC: 8.3 MG/DL (ref 8.6–10.2)
CHLORIDE BLD-SCNC: 109 MMOL/L (ref 98–107)
CO2: 21 MMOL/L (ref 22–29)
CREAT SERPL-MCNC: 1.7 MG/DL (ref 0.7–1.2)
GFR AFRICAN AMERICAN: 45
GFR NON-AFRICAN AMERICAN: 38 ML/MIN/1.73
GLUCOSE BLD-MCNC: 91 MG/DL (ref 74–109)
HCT VFR BLD CALC: 32.1 % (ref 37–54)
HEMOGLOBIN: 10.6 G/DL (ref 12.5–16.5)
MCH RBC QN AUTO: 29.6 PG (ref 26–35)
MCHC RBC AUTO-ENTMCNC: 33 % (ref 32–34.5)
MCV RBC AUTO: 89.7 FL (ref 80–99.9)
PDW BLD-RTO: 12 FL (ref 11.5–15)
PLATELET # BLD: 161 E9/L (ref 130–450)
PMV BLD AUTO: 10.2 FL (ref 7–12)
POTASSIUM SERPL-SCNC: 4.6 MMOL/L (ref 3.5–5)
RBC # BLD: 3.58 E12/L (ref 3.8–5.8)
SODIUM BLD-SCNC: 142 MMOL/L (ref 132–146)
WBC # BLD: 5.5 E9/L (ref 4.5–11.5)

## 2018-07-15 PROCEDURE — 6360000002 HC RX W HCPCS: Performed by: GENERAL PRACTICE

## 2018-07-15 PROCEDURE — 80048 BASIC METABOLIC PNL TOTAL CA: CPT

## 2018-07-15 PROCEDURE — 36415 COLL VENOUS BLD VENIPUNCTURE: CPT

## 2018-07-15 PROCEDURE — 6370000000 HC RX 637 (ALT 250 FOR IP): Performed by: GENERAL PRACTICE

## 2018-07-15 PROCEDURE — 2140000000 HC CCU INTERMEDIATE R&B

## 2018-07-15 PROCEDURE — 2580000003 HC RX 258: Performed by: INTERNAL MEDICINE

## 2018-07-15 PROCEDURE — 85027 COMPLETE CBC AUTOMATED: CPT

## 2018-07-15 PROCEDURE — 88112 CYTOPATH CELL ENHANCE TECH: CPT

## 2018-07-15 PROCEDURE — 2580000003 HC RX 258: Performed by: GENERAL PRACTICE

## 2018-07-15 RX ADMIN — Medication 10 ML: at 20:17

## 2018-07-15 RX ADMIN — METOPROLOL SUCCINATE 100 MG: 100 TABLET, FILM COATED, EXTENDED RELEASE ORAL at 09:34

## 2018-07-15 RX ADMIN — ASPIRIN 81 MG: 81 TABLET, COATED ORAL at 09:34

## 2018-07-15 RX ADMIN — ATORVASTATIN CALCIUM 40 MG: 40 TABLET, FILM COATED ORAL at 09:34

## 2018-07-15 RX ADMIN — ASPIRIN 81 MG: 81 TABLET, COATED ORAL at 20:17

## 2018-07-15 RX ADMIN — ENOXAPARIN SODIUM 30 MG: 30 INJECTION SUBCUTANEOUS at 09:34

## 2018-07-15 RX ADMIN — ISOSORBIDE MONONITRATE 30 MG: 30 TABLET ORAL at 09:34

## 2018-07-15 RX ADMIN — FINASTERIDE 5 MG: 5 TABLET, FILM COATED ORAL at 20:17

## 2018-07-15 RX ADMIN — SODIUM CHLORIDE: 9 INJECTION, SOLUTION INTRAVENOUS at 05:35

## 2018-07-15 ASSESSMENT — PAIN SCALES - GENERAL
PAINLEVEL_OUTOF10: 0

## 2018-07-15 NOTE — CONSULTS
7/15/2018 8:10 AM  Service: Urology  Group: [facility] ([facility])    Hao Schrader  [id number]     Chief Complaint: bladder mass    History of Present Illness: The patient is a 80 y.o. male patient who presents with SOB  The patient has an increased Cr  This   He did have a FLORECITA done  He was found to have lesion in his bladder  He has seen a urologist in the past , Dr Abril Carranza  He was seen 1 year  He does have a HO BPH  He is taking proscar  He does not have gross hematuria  All options were discussed  He has not had a cysto in the past      Past Medical History:   Diagnosis Date    Cancer ([address]     colon    Hyperlipidemia     Hypertension        Past Surgical History:   Procedure Laterality Date    COLON SURGERY      ECHOCARDIOGRAM COMPLETE 2D W DOPPLER W COLOR  10/8/2012         HERNIA REPAIR         Medications Prior to Admission:    Prescriptions Prior to Admission: isosorbide mononitrate (IMDUR) 30 MG extended release tablet, Take 30 mg by mouth daily  aspirin EC 81 MG EC tablet, Take 81 mg by mouth 2 times daily   metoprolol succinate (TOPROL XL) 100 MG extended release tablet, Take 1 tablet by mouth daily  spironolactone (ALDACTONE) 25 MG tablet, Take 1 tablet by mouth daily  furosemide (LASIX) 20 MG tablet, Take 40 mg by mouth once   finasteride (PROSCAR) 5 MG tablet, Take 5 mg by mouth nightly   Multiple Vitamin (MULTIVITAMIN PO), Take 1 tablet by mouth daily   atorvastatin (LIPITOR) 40 MG tablet, Take 1 tablet by mouth daily. lisinopril (PRINIVIL;ZESTRIL) 20 MG tablet, Take 1 tablet by mouth every 12 hours. potassium chloride SA (K-DUR;KLOR-CON) 20 MEQ tablet, Take 1 tablet by mouth daily (with breakfast). Allergies:    Patient has no known allergies. Social History:    reports that he has never smoked. He has never used smokeless tobacco. He reports that he does not drink alcohol or use drugs.     Family History:   Non-contributory to this urological problem  family history is not on and   direct examination and visualization and evaluation is recommended. 2. Right perinephric fluid.        Images:      Assessment: Normalesley Gabrielaer 80 y.o. male     Bladder mass found on FLORECITA  ARF resolving    Plan:    FLORECITA was reviewed  Send urine cytology  Will need cysto at some point  He does have poor cardiac function  Cont the proscar  Will need outpatient FU      Kasie Johnson DO   [facility]

## 2018-07-16 LAB
ANION GAP SERPL CALCULATED.3IONS-SCNC: 11 MMOL/L (ref 7–16)
BUN BLDV-MCNC: 26 MG/DL (ref 8–23)
CALCIUM SERPL-MCNC: 7.9 MG/DL (ref 8.6–10.2)
CHLORIDE BLD-SCNC: 109 MMOL/L (ref 98–107)
CO2: 21 MMOL/L (ref 22–29)
CREAT SERPL-MCNC: 1.5 MG/DL (ref 0.7–1.2)
GFR AFRICAN AMERICAN: 52
GFR NON-AFRICAN AMERICAN: 43 ML/MIN/1.73
GLUCOSE BLD-MCNC: 88 MG/DL (ref 74–109)
POTASSIUM SERPL-SCNC: 4.3 MMOL/L (ref 3.5–5)
SODIUM BLD-SCNC: 141 MMOL/L (ref 132–146)

## 2018-07-16 PROCEDURE — G8987 SELF CARE CURRENT STATUS: HCPCS

## 2018-07-16 PROCEDURE — 97110 THERAPEUTIC EXERCISES: CPT

## 2018-07-16 PROCEDURE — 97165 OT EVAL LOW COMPLEX 30 MIN: CPT

## 2018-07-16 PROCEDURE — 97161 PT EVAL LOW COMPLEX 20 MIN: CPT

## 2018-07-16 PROCEDURE — 99233 SBSQ HOSP IP/OBS HIGH 50: CPT | Performed by: INTERNAL MEDICINE

## 2018-07-16 PROCEDURE — G8989 SELF CARE D/C STATUS: HCPCS

## 2018-07-16 PROCEDURE — 36415 COLL VENOUS BLD VENIPUNCTURE: CPT

## 2018-07-16 PROCEDURE — G8978 MOBILITY CURRENT STATUS: HCPCS

## 2018-07-16 PROCEDURE — G8980 MOBILITY D/C STATUS: HCPCS

## 2018-07-16 PROCEDURE — 80048 BASIC METABOLIC PNL TOTAL CA: CPT

## 2018-07-16 PROCEDURE — G8988 SELF CARE GOAL STATUS: HCPCS

## 2018-07-16 PROCEDURE — 2580000003 HC RX 258: Performed by: GENERAL PRACTICE

## 2018-07-16 PROCEDURE — 6360000002 HC RX W HCPCS: Performed by: GENERAL PRACTICE

## 2018-07-16 PROCEDURE — 2140000000 HC CCU INTERMEDIATE R&B

## 2018-07-16 PROCEDURE — 6370000000 HC RX 637 (ALT 250 FOR IP): Performed by: GENERAL PRACTICE

## 2018-07-16 RX ADMIN — ASPIRIN 81 MG: 81 TABLET, COATED ORAL at 08:26

## 2018-07-16 RX ADMIN — ISOSORBIDE MONONITRATE 30 MG: 30 TABLET ORAL at 08:26

## 2018-07-16 RX ADMIN — Medication 10 ML: at 21:15

## 2018-07-16 RX ADMIN — ASPIRIN 81 MG: 81 TABLET, COATED ORAL at 21:14

## 2018-07-16 RX ADMIN — METOPROLOL SUCCINATE 100 MG: 100 TABLET, FILM COATED, EXTENDED RELEASE ORAL at 08:26

## 2018-07-16 RX ADMIN — Medication 10 ML: at 08:27

## 2018-07-16 RX ADMIN — FINASTERIDE 5 MG: 5 TABLET, FILM COATED ORAL at 21:14

## 2018-07-16 RX ADMIN — ATORVASTATIN CALCIUM 40 MG: 40 TABLET, FILM COATED ORAL at 08:26

## 2018-07-16 RX ADMIN — ENOXAPARIN SODIUM 30 MG: 30 INJECTION SUBCUTANEOUS at 08:27

## 2018-07-16 ASSESSMENT — PAIN SCALES - GENERAL
PAINLEVEL_OUTOF10: 0

## 2018-07-16 NOTE — PROGRESS NOTES
Physical Therapy    [facility]  Initial Assessment    NAME: Carla Simental  : 1922  MRN: [id number]    Date of Service: 2018  Evaluating Therapist: Jennifer Lorenzo DPT    Room #: 7468F  DIAGNOSIS: UZIEL  PRECAUTIONS: falls   Pertinent Medical History: h/o colon CA, HLD, HTN    Social:    Pt lives alone. Pt lives in a 1 story home with 1 steps and 1 grab bar/ hand rail(s) to enter, full flight to basement laundry with 1 rail. Prior to admission pt used no device or used ww and required intermittent assistance with IADLs--family would provide dinner for pt most nights. Pt drives and does his own grocery shopping. Initial Evaluation  Date: 18 Treatment  Date: NA  Short Term/ Long Term   Goals   Was pt agreeable to Eval/treatment? Yes     Did pt report pain? Pt denied pain     Bed Mobility  Rolling: mod I  Supine to sit: mod I  Sit to supine: NT  Scooting: mod I     Transfers Sit to stand: mod I  Stand to sit: mod I  Stand pivot: mod I     Ambulation   400 feet with ww with mod I     Stair negotiation: ascended and descended 12 steps with 1 rail with mod I     AMPAC Raw Score 24/24       Alertness/Orientation: x4  LE AROM: Grossly WFL  LE Strength: Grossly 4+/5  Static Balance: mod I  Dynamic Balance: mod I  Endurance: good  Sensation: denied numbness/tinglign   Edema/Skin Integrity: no visible skin integrity compromise noted     Chair/Bed Alarm: NA      ASSESSMENT/TREATMENT  Pt displays functional ability as noted in the objective portion of this evaluation. Pt performed the following therapeutic activities/exercise:  Seated ankle pumps, LAQ, hip flexion, hip abduction/adduction, shoulder press, elbow flexion/extension x30 reps B  STS x 8 reps     Comments: Pt was supine transferring to EOB upon PT arrival and agreeable to PT evaluation/treatment.  Pt demonstrated mod I / independence with all mobility; cues were required initially for safety regarding use of ww then

## 2018-07-16 NOTE — PROGRESS NOTES
Patient seen feels better. Urology consult noted. Await cytology.  Renal function returning to normal. May have to resume cardiac meds soon

## 2018-07-16 NOTE — PROGRESS NOTES
[facility]  Nephrology Attending Progress Note  Guillermo Fay.  Shea Hooker MD        SUBJECTIVE:     7/14: no diarrhea today  7/15: no diarrhea, not eating well, wt up 4 lbs  7/16: no cp or sob      PROBLEM LIST:    Patient Active Problem List   Diagnosis    Pneumonia due to organism    [facility]    Hypoxia    HTN (hypertension)    Valvular heart disease    Chronic systolic CHF (congestive heart failure) (HCC)    LAFB (left anterior fascicular block)    Dyspnea    Short of breath on exertion    Shortness of breath    UZIEL (acute kidney injury) ([facility]    Acute kidney injury (nontraumatic) (HCC)    Chronic combined gastric and duodenal ulcer    Coronary artery disease involving native coronary artery of native heart without angina pectoris        PAST MEDICAL HISTORY:    Past Medical History:   Diagnosis Date    Cancer ([facility]     colon    Hyperlipidemia     Hypertension        DIET:    DIET LOW SODIUM 2 GM;     PHYSICAL EXAM:     Patient Vitals for the past 24 hrs:   BP Temp Temp src Pulse Resp SpO2 Weight   07/16/18 0736 (!) 141/81 97.6 °F (36.4 °C) Oral 85 16 97 % -   07/16/18 0558 - - - - - - 147 lb 6.4 oz (66.9 kg)   07/16/18 0340 126/61 97.8 °F (36.6 °C) Oral 56 18 96 % -   07/16/18 0015 (!) 114/55 98 °F (36.7 °C) Oral 56 18 99 % -   07/15/18 2016 - - - - - 98 % -   07/15/18 1610 (!) 116/56 97.7 °F (36.5 °C) Oral 60 20 98 % -   @      Intake/Output Summary (Last 24 hours) at 07/16/18 1251  Last data filed at 07/16/18 1200   Gross per 24 hour   Intake             1799 ml   Output             1050 ml   Net              749 ml         Wt Readings from Last 3 Encounters:   07/16/18 147 lb 6.4 oz (66.9 kg)   04/30/18 149 lb 4.8 oz (67.7 kg)   12/15/17 143 lb (64.9 kg)       Constitutional:  Pt is in no acute distress  Head: normocephalic, atraumatic  Neck: no JVD  Cardiovascular: regular rate and rhythm, no murmurs, gallops, or rubs  Respiratory:  No rales, rhochi, or

## 2018-07-16 NOTE — PROGRESS NOTES
with all devices within reach. Assessment of current deficits   Functional mobility []  ROM [] Strength []  Cognition []  ADLs []   IADLs [] Safety Awareness [] Endurance []  Fine Motor Coordination [] Balance [] Vision/perception [] Sensation []   Gross Motor Coordination []     Eval Complexity: low  Profile and History- low  Assessment of Occupational Performance and Identification of Deficits- low  Clinical Decision Making- low    Treatment frequency: evaluation only    Plan of Care:  ADL retraining []   Equipment needs []   Neuromuscular re-education [] Energy Conservation Techniques []  Functional Transfer training [] Patient and/or Family Education []  Functional Mobility training []  Environmental Modifications []  Cognitive re-training []   Compensatory techniques for ADLs []  Splinting Needs []   Positioning to improve overall function []  Other: []      Rehab Potential: Good    Patient / Family Goal: Return home    Short term goals  Time Frame: Evaluation only - Skilled OT services not indicated  Pt functioning at baseline    Patient and/or family understands diagnosis, prognosis and plan of care: yes    [] Malnutrition indicators have been identified and nursing has been notified to ensure a dietitian consult is ordered.      Time in: 10:28  Time out: 10:43  Total Time: 15 minutes

## 2018-07-16 NOTE — PROGRESS NOTES
[facility] PROGRESS NOTE                                                                       7/16/2018        CHIEF UROLOGIC COMPLAINT: BPH, Bladder mass, microscopic hematuria     HISTORY OF PRESENT ILLNESS:  Patient without new complaints. SOB has improved. No pain with voiding. Denies hematuria. REVIEW OF SYSTEMS:   CONSTITUTIONAL: negative  HEENT: negative  HEMATOLOGIC: negative  ENDOCRINE: negative  RESPIRATORY: negative  CV: negative  GI: negative  NEURO: negative  ORTHOPEDICS: negative  PSYCHIATRIC: negative  : as above    PAST FAMILY HISTORY:  History reviewed. No pertinent family history.   PAST SOCIAL HISTORY:    Social History     Social History    Marital status:      Spouse name: N/A    Number of children: N/A    Years of education: N/A     Social History Main Topics    Smoking status: Never Smoker    Smokeless tobacco: Never Used    Alcohol use No    Drug use: No    Sexual activity: Not Asked     Other Topics Concern    None     Social History Narrative    None       Scheduled Meds:   sodium chloride flush  10 mL Intravenous 2 times per day    aspirin EC  81 mg Oral BID    atorvastatin  40 mg Oral Daily    finasteride  5 mg Oral Nightly    isosorbide mononitrate  30 mg Oral Daily    metoprolol succinate  100 mg Oral Daily    enoxaparin  30 mg Subcutaneous Daily     Continuous Infusions:  PRN Meds:.nitroGLYCERIN, sodium chloride flush, acetaminophen    /61   Pulse 56   Temp 97.8 °F (36.6 °C) (Oral)   Resp 18   Ht 5' 8\" (1.727 m)   Wt 147 lb 6.4 oz (66.9 kg)   SpO2 96%   BMI 22.41 kg/m²     Lab Results   Component Value Date    WBC 5.5 07/15/2018    HGB 10.6 (L) 07/15/2018    HCT 32.1 (L) 07/15/2018    MCV 89.7 07/15/2018     07/15/2018       Lab Results   Component Value Date    CREATININE 1.7 (H) 07/15/2018       No results found for: PSA        PHYSICAL EXAMINATION:  Skin dry, without rashes  Respirations non-labored, intact  Abdomen soft, non-tender, non-distended  Alert and oriented x3  Urine per urinal clear      ASSESSMENT AND PLAN:  1. H/o BPH on proscar with bladder US showing possible bladder mass and UA with microscopic hematuria  -urine is clear  -cytology is pending  -patient agrees to have office scope at next appt  -continue proscar    Please contact us with questions/concerns.        Electronically signed by Ghassan John MD on 7/16/2018 at 6:33 AM

## 2018-07-17 LAB
ANION GAP SERPL CALCULATED.3IONS-SCNC: 12 MMOL/L (ref 7–16)
BUN BLDV-MCNC: 24 MG/DL (ref 8–23)
CALCIUM SERPL-MCNC: 8.6 MG/DL (ref 8.6–10.2)
CHLORIDE BLD-SCNC: 102 MMOL/L (ref 98–107)
CO2: 21 MMOL/L (ref 22–29)
CREAT SERPL-MCNC: 1.4 MG/DL (ref 0.7–1.2)
GFR AFRICAN AMERICAN: 57
GFR NON-AFRICAN AMERICAN: 47 ML/MIN/1.73
GLUCOSE BLD-MCNC: 92 MG/DL (ref 74–109)
POTASSIUM SERPL-SCNC: 4.2 MMOL/L (ref 3.5–5)
SODIUM BLD-SCNC: 135 MMOL/L (ref 132–146)

## 2018-07-17 PROCEDURE — 36415 COLL VENOUS BLD VENIPUNCTURE: CPT

## 2018-07-17 PROCEDURE — 6370000000 HC RX 637 (ALT 250 FOR IP): Performed by: GENERAL PRACTICE

## 2018-07-17 PROCEDURE — 80048 BASIC METABOLIC PNL TOTAL CA: CPT

## 2018-07-17 PROCEDURE — 2140000000 HC CCU INTERMEDIATE R&B

## 2018-07-17 PROCEDURE — 6360000002 HC RX W HCPCS: Performed by: GENERAL PRACTICE

## 2018-07-17 PROCEDURE — 2580000003 HC RX 258: Performed by: GENERAL PRACTICE

## 2018-07-17 RX ORDER — LISINOPRIL 10 MG/1
10 TABLET ORAL DAILY
Status: DISCONTINUED | OUTPATIENT
Start: 2018-07-17 | End: 2018-07-18 | Stop reason: HOSPADM

## 2018-07-17 RX ADMIN — Medication 10 ML: at 08:21

## 2018-07-17 RX ADMIN — ASPIRIN 81 MG: 81 TABLET, COATED ORAL at 08:21

## 2018-07-17 RX ADMIN — LISINOPRIL 10 MG: 10 TABLET ORAL at 10:04

## 2018-07-17 RX ADMIN — ATORVASTATIN CALCIUM 40 MG: 40 TABLET, FILM COATED ORAL at 08:21

## 2018-07-17 RX ADMIN — ASPIRIN 81 MG: 81 TABLET, COATED ORAL at 21:51

## 2018-07-17 RX ADMIN — Medication 10 ML: at 21:51

## 2018-07-17 RX ADMIN — METOPROLOL SUCCINATE 100 MG: 100 TABLET, FILM COATED, EXTENDED RELEASE ORAL at 08:21

## 2018-07-17 RX ADMIN — FINASTERIDE 5 MG: 5 TABLET, FILM COATED ORAL at 21:51

## 2018-07-17 RX ADMIN — ISOSORBIDE MONONITRATE 30 MG: 30 TABLET ORAL at 08:21

## 2018-07-17 RX ADMIN — ENOXAPARIN SODIUM 30 MG: 30 INJECTION SUBCUTANEOUS at 08:21

## 2018-07-17 ASSESSMENT — PAIN SCALES - GENERAL
PAINLEVEL_OUTOF10: 0

## 2018-07-17 NOTE — PROGRESS NOTES
Patient seen feels well . Will restart ace. Follow renal function.  No edema, may need to restart loop diuretic also

## 2018-07-18 VITALS
BODY MASS INDEX: 22.51 KG/M2 | RESPIRATION RATE: 18 BRPM | DIASTOLIC BLOOD PRESSURE: 68 MMHG | SYSTOLIC BLOOD PRESSURE: 128 MMHG | OXYGEN SATURATION: 94 % | TEMPERATURE: 98.9 F | WEIGHT: 148.5 LBS | HEART RATE: 98 BPM | HEIGHT: 68 IN

## 2018-07-18 LAB
ANION GAP SERPL CALCULATED.3IONS-SCNC: 10 MMOL/L (ref 7–16)
BUN BLDV-MCNC: 23 MG/DL (ref 8–23)
CALCIUM SERPL-MCNC: 8.1 MG/DL (ref 8.6–10.2)
CHLORIDE BLD-SCNC: 103 MMOL/L (ref 98–107)
CO2: 23 MMOL/L (ref 22–29)
CREAT SERPL-MCNC: 1.3 MG/DL (ref 0.7–1.2)
GFR AFRICAN AMERICAN: >60
GFR NON-AFRICAN AMERICAN: 51 ML/MIN/1.73
GLUCOSE BLD-MCNC: 85 MG/DL (ref 74–109)
POTASSIUM SERPL-SCNC: 4.3 MMOL/L (ref 3.5–5)
SODIUM BLD-SCNC: 136 MMOL/L (ref 132–146)

## 2018-07-18 PROCEDURE — 6370000000 HC RX 637 (ALT 250 FOR IP): Performed by: GENERAL PRACTICE

## 2018-07-18 PROCEDURE — 36415 COLL VENOUS BLD VENIPUNCTURE: CPT

## 2018-07-18 PROCEDURE — 6360000002 HC RX W HCPCS: Performed by: GENERAL PRACTICE

## 2018-07-18 PROCEDURE — 2580000003 HC RX 258: Performed by: GENERAL PRACTICE

## 2018-07-18 PROCEDURE — 80048 BASIC METABOLIC PNL TOTAL CA: CPT

## 2018-07-18 RX ORDER — FUROSEMIDE 20 MG/1
20 TABLET ORAL DAILY
Qty: 60 TABLET | Refills: 3 | Status: SHIPPED | OUTPATIENT
Start: 2018-07-18

## 2018-07-18 RX ORDER — NITROGLYCERIN 0.4 MG/1
TABLET SUBLINGUAL
Qty: 25 TABLET | Refills: 3 | Status: SHIPPED | OUTPATIENT
Start: 2018-07-18

## 2018-07-18 RX ORDER — FUROSEMIDE 20 MG/1
20 TABLET ORAL DAILY
Status: DISCONTINUED | OUTPATIENT
Start: 2018-07-18 | End: 2018-07-18 | Stop reason: HOSPADM

## 2018-07-18 RX ORDER — LISINOPRIL 10 MG/1
10 TABLET ORAL DAILY
Qty: 30 TABLET | Refills: 3 | Status: SHIPPED | OUTPATIENT
Start: 2018-07-19

## 2018-07-18 RX ADMIN — ATORVASTATIN CALCIUM 40 MG: 40 TABLET, FILM COATED ORAL at 08:22

## 2018-07-18 RX ADMIN — LISINOPRIL 10 MG: 10 TABLET ORAL at 08:22

## 2018-07-18 RX ADMIN — METOPROLOL SUCCINATE 100 MG: 100 TABLET, FILM COATED, EXTENDED RELEASE ORAL at 08:22

## 2018-07-18 RX ADMIN — FUROSEMIDE 20 MG: 20 TABLET ORAL at 09:06

## 2018-07-18 RX ADMIN — Medication 10 ML: at 08:22

## 2018-07-18 RX ADMIN — ENOXAPARIN SODIUM 30 MG: 30 INJECTION SUBCUTANEOUS at 08:22

## 2018-07-18 RX ADMIN — ASPIRIN 81 MG: 81 TABLET, COATED ORAL at 08:22

## 2018-07-18 RX ADMIN — ISOSORBIDE MONONITRATE 30 MG: 30 TABLET ORAL at 08:22

## 2018-07-18 ASSESSMENT — PAIN SCALES - GENERAL: PAINLEVEL_OUTOF10: 0

## 2018-07-19 NOTE — DISCHARGE SUMMARY
[address]                   [address] [facility],  [facility]                                 DISCHARGE SUMMARY    PATIENT NAME: Joellen Bazan                     :        1922  MED REC NO:   [id number]                            ROOM:       6518  ACCOUNT NO:   [de-identified]                           ADMIT DATE: 2018  PROVIDER:     Allie Rodriguez DO                  DISCHARGE DATE: 2018    FINAL DIAGNOSES:  1. Acute kidney injury on chronic kidney disease in the setting of ACE  inhibitors, Aldactone, diuretics. 2.  Hyperkalemia. 3.  Chest pain and shortness of breath. 4.  Hypertension. 5.  History of colon cancer. 6.  Bladder abnormality seen on ultrasound, rule out tumor. 7.  Chronic systolic congestive heart failure. 8.  Coronary artery disease. 9.  Volume contraction. HOSPITAL COURSE:  This is a 59-year-old white male, who presented to the  emergency department with chest pain and shortness of breath. He states  that his shortness of breath became worse after going to breakfast early in  the morning. He had some diarrhea over the last several days. His urinary  output has been decreased and his fluid intake has been decreased and when  he presented to the emergency department, he had a potassium of 5.4. BUN  was up at 57 and creatinine was at 3, significantly elevated from his  previous baseline. He had a chest x-ray that showed very small bilateral  pleural effusions. Diuretics, ACE inhibitors, and potassium were held. Gentle fluid replacement was given. His BUN and creatinine trended down  very nicely. He had an ultrasound of his kidneys, which showed a mass-like  effect in the bladder. It could be on the basis of bladder contraction,  but Urology was consulted for, cytology was asked for and findings were  inconclusive. He was elected to have cystoscopy as an outpatient.   His  blood pressure a kind of peaked up a little bit being off his medications,  but we went ahead and gently started his ACE inhibitor and Lasix as well. We are going to go ahead and supplement him with potassium also and he is  ambulating with minimal assistance and tolerating his meals. He is having  no problem with voiding or moving his bowels. Appetite is good. We are  going to go ahead and send him home. He was on lisinopril 20 b.i.d.,  Aldactone, and Lasix. He will go home on lisinopril 10 and 20 of Lasix and  continuation of his potassium, but we are going to hold the Aldactone. He  does have a little bit of lower extremity edema, but no shortness of  breath, no orthopnea, no paroxysmal nocturnal dyspnea, and no chest pain. He seems to be stable and we will discharge him. We will see him in the  office in a couple weeks. At the time of discharge, his condition was  improved. His prognosis is guarded.       Rosalia Giron DO    D: 07/18/2018 9:02:20       T: 07/18/2018 9:03:40     [id number]  Job#: [id number]     Doc#: [id number]  CC:

## 2018-08-01 ENCOUNTER — OFFICE VISIT (OUTPATIENT)
Dept: CARDIOLOGY CLINIC | Age: 83
End: 2018-08-01
Payer: MEDICARE

## 2018-08-01 VITALS
RESPIRATION RATE: 16 BRPM | HEIGHT: 67 IN | DIASTOLIC BLOOD PRESSURE: 70 MMHG | WEIGHT: 147 LBS | SYSTOLIC BLOOD PRESSURE: 112 MMHG | BODY MASS INDEX: 23.07 KG/M2 | HEART RATE: 56 BPM

## 2018-08-01 DIAGNOSIS — N17.9 AKI (ACUTE KIDNEY INJURY) (HCC): ICD-10-CM

## 2018-08-01 DIAGNOSIS — I50.22 CHRONIC SYSTOLIC HEART FAILURE (HCC): Primary | ICD-10-CM

## 2018-08-01 DIAGNOSIS — I10 ESSENTIAL HYPERTENSION: ICD-10-CM

## 2018-08-01 DIAGNOSIS — I38 VHD (VALVULAR HEART DISEASE): ICD-10-CM

## 2018-08-01 PROCEDURE — 1101F PT FALLS ASSESS-DOCD LE1/YR: CPT | Performed by: PHYSICIAN ASSISTANT

## 2018-08-01 PROCEDURE — G8420 CALC BMI NORM PARAMETERS: HCPCS | Performed by: PHYSICIAN ASSISTANT

## 2018-08-01 PROCEDURE — 1036F TOBACCO NON-USER: CPT | Performed by: PHYSICIAN ASSISTANT

## 2018-08-01 PROCEDURE — 93000 ELECTROCARDIOGRAM COMPLETE: CPT | Performed by: INTERNAL MEDICINE

## 2018-08-01 PROCEDURE — G8598 ASA/ANTIPLAT THER USED: HCPCS | Performed by: PHYSICIAN ASSISTANT

## 2018-08-01 PROCEDURE — 99213 OFFICE O/P EST LOW 20 MIN: CPT | Performed by: PHYSICIAN ASSISTANT

## 2018-08-01 PROCEDURE — 1111F DSCHRG MED/CURRENT MED MERGE: CPT | Performed by: PHYSICIAN ASSISTANT

## 2018-08-01 PROCEDURE — 1123F ACP DISCUSS/DSCN MKR DOCD: CPT | Performed by: PHYSICIAN ASSISTANT

## 2018-08-01 PROCEDURE — 4040F PNEUMOC VAC/ADMIN/RCVD: CPT | Performed by: PHYSICIAN ASSISTANT

## 2018-08-01 PROCEDURE — G8427 DOCREV CUR MEDS BY ELIG CLIN: HCPCS | Performed by: PHYSICIAN ASSISTANT

## 2018-08-01 NOTE — PROGRESS NOTES
Hyperlipidemia     Hypertension     Kidney failure         SURGICAL HISTORY:   Past Surgical History:   Procedure Laterality Date    COLON SURGERY      ECHOCARDIOGRAM COMPLETE 2D W DOPPLER W COLOR  10/8/2012         HERNIA REPAIR          SOCIAL AND OCCUPATIONAL HEALTH HISTORY:    Social History     Social History    Marital status:      Spouse name: N/A    Number of children: N/A    Years of education: N/A     Occupational History    Not on file. Social History Main Topics    Smoking status: Never Smoker    Smokeless tobacco: Never Used    Alcohol use No    Drug use: No    Sexual activity: Not on file     Other Topics Concern    Not on file     Social History Narrative    No narrative on file       MEDICATIONS:   Current Outpatient Prescriptions   Medication Sig Dispense Refill    nitroGLYCERIN (NITROSTAT) 0.4 MG SL tablet up to max of 3 total doses. If no relief after 1 dose, call 911. 25 tablet 3    lisinopril (PRINIVIL;ZESTRIL) 10 MG tablet Take 1 tablet by mouth daily 30 tablet 3    furosemide (LASIX) 20 MG tablet Take 1 tablet by mouth daily 60 tablet 3    isosorbide mononitrate (IMDUR) 30 MG extended release tablet Take 30 mg by mouth daily      aspirin EC 81 MG EC tablet Take 81 mg by mouth 2 times daily       metoprolol succinate (TOPROL XL) 100 MG extended release tablet Take 1 tablet by mouth daily 30 tablet 3    finasteride (PROSCAR) 5 MG tablet Take 5 mg by mouth nightly       Multiple Vitamin (MULTIVITAMIN PO) Take 1 tablet by mouth daily       atorvastatin (LIPITOR) 40 MG tablet Take 1 tablet by mouth daily. 30 tablet 3    potassium chloride SA (K-DUR;KLOR-CON) 20 MEQ tablet Take 1 tablet by mouth daily (with breakfast). 60 tablet 3     No current facility-administered medications for this visit. FAMILY HISTORY: A review of medical events in the patient's family , including disease which may be hereditary or place the patient at risk were reviewed.    History EKG 7/12/18)      ASSESSMENT/PLAN:     1. Chronic systolic/diastolic CHF - compensated on exam today  2. Acute on CKD/Hyperkalemia- Aldactone stopped. Lisinopril/Lasix dosing halved  3. CAD -- medically managed, denies any anginal equivalent complaints  4. History of hypoxemic respiratory failure in 10/2012   5. HTN - controlled  6. HLD - on statin  7. Mild-moderate valve disease in 10/2012 (similar findings on 12/4/17 echocardiogram)  8. Chronic LAFB    - Encouraged low sodium diet, monitoring of daily weights, elevation of legs when at rest  - monitor lytes/creatinine closely  - continue close f/u with renal team and pcp  - monitor VHD with serial Echos  - continue Lasix, Lisinopril, Toprol XL, Imdur  - continue ASA, statin  - return to see Dr. Reyes Congress in 3 months or sooner if any issues arise               Thank you for entrusting us to participate in [facility] care. If you have any questions, please don't hesitate to call us at [facility].     Sincerely,    [facility]

## 2018-08-01 NOTE — PATIENT INSTRUCTIONS
you how much sodium you get in one serving. Check the serving size. If you eat more than one serving, you are getting more sodium. · Be aware that sodium can come in forms other than salt, including monosodium glutamate (MSG), sodium citrate, and sodium bicarbonate (baking soda). MSG is often added to Asian food. You can sometimes ask for food without MSG or salt. · Slowly reducing salt will help you adjust to the taste. Take the salt shaker off the table. · Flavor your food with garlic, lemon juice, onion, vinegar, herbs, and spices instead of salt. Do not use soy sauce, steak sauce, onion salt, garlic salt, mustard, or ketchup on your food, unless it is labeled \"low-sodium\" or \"low-salt. \"  · Make your own salad dressings, sauces, and ketchup without adding salt. · Use fresh or frozen ingredients, instead of canned ones, whenever you can. Choose low-sodium canned goods. · Eat less processed food and food from restaurants, including fast food. Exercise as directed  Moderate, regular exercise is very good for your heart. It improves your blood flow and helps control your weight. But too much exercise can stress your heart and cause a heart failure flare-up. · Check with your doctor before you start an exercise program.  · Walking is an easy way to get exercise. Start out slowly. Gradually increase the length and pace of your walk. Swimming, riding a bike, and using a treadmill are also good forms of exercise. · When you exercise, watch for signs that your heart is working too hard. You are pushing yourself too hard if you cannot talk while you are exercising. If you become short of breath or dizzy or have chest pain, stop, sit down, and rest.  · Do not exercise when you do not feel well. Take medicines correctly  · Take your medicines exactly as prescribed. Call your doctor if you think you are having a problem with your medicine. · Make a list of all the medicines you take.  Include those prescribed to you by other doctors and any over-the-counter medicines, vitamins, or supplements you take. Take this list with you when you go to any doctor. · Take your medicines at the same time every day. It may help you to post a list of all the medicines you take every day and what time of day you take them. · Make taking your medicine as simple as you can. Plan times to take your medicines when you are doing other things, such as eating a meal or getting ready for bed. This will make it easier to remember to take your medicines. · Get organized. Use helpful tools, such as daily or weekly pill containers. When should you call for help? Call 911 if you have symptoms of sudden heart failure such as:    · You have severe trouble breathing.     · You cough up pink, foamy mucus.     · You have a new irregular or rapid heartbeat.    Call your doctor now or seek immediate medical care if:    · You have new or increased shortness of breath.     · You are dizzy or lightheaded, or you feel like you may faint.     · You have sudden weight gain, such as more than 2 to 3 pounds in a day or 5 pounds in a week. (Your doctor may suggest a different range of weight gain.)     · You have increased swelling in your legs, ankles, or feet.     · You are suddenly so tired or weak that you cannot do your usual activities.    Watch closely for changes in your health, and be sure to contact your doctor if you develop new symptoms. Where can you learn more? Go to https://[employer]. org and sign in to your [employer] account. Enter D781 in the [facility] box to learn more about \"Avoiding Triggers With Heart Failure: Care Instructions. \"     If you do not have an account, please click on the \"Sign Up Now\" link. Current as of: December 6, 2017  Content Version: 11.6  © 9173-8424 [employer]. Care instructions adapted under license by [facility] [email or web address] [facility] ([facility]).  If you have questions about a medical condition or this

## 2018-11-06 ENCOUNTER — OFFICE VISIT (OUTPATIENT)
Dept: CARDIOLOGY CLINIC | Age: 83
End: 2018-11-06
Payer: MEDICARE

## 2018-11-06 VITALS
HEIGHT: 68 IN | SYSTOLIC BLOOD PRESSURE: 128 MMHG | WEIGHT: 149.8 LBS | HEART RATE: 58 BPM | BODY MASS INDEX: 22.7 KG/M2 | DIASTOLIC BLOOD PRESSURE: 60 MMHG | RESPIRATION RATE: 18 BRPM

## 2018-11-06 DIAGNOSIS — I44.4 LAFB (LEFT ANTERIOR FASCICULAR BLOCK): ICD-10-CM

## 2018-11-06 DIAGNOSIS — I50.42 CHRONIC COMBINED SYSTOLIC (CONGESTIVE) AND DIASTOLIC (CONGESTIVE) HEART FAILURE (HCC): Primary | ICD-10-CM

## 2018-11-06 DIAGNOSIS — I10 ESSENTIAL HYPERTENSION: ICD-10-CM

## 2018-11-06 DIAGNOSIS — I25.10 CORONARY ARTERY DISEASE INVOLVING NATIVE CORONARY ARTERY OF NATIVE HEART WITHOUT ANGINA PECTORIS: ICD-10-CM

## 2018-11-06 DIAGNOSIS — I38 VALVULAR HEART DISEASE: ICD-10-CM

## 2018-11-06 DIAGNOSIS — I50.9 CONGESTIVE HEART FAILURE, UNSPECIFIED HF CHRONICITY, UNSPECIFIED HEART FAILURE TYPE (HCC): ICD-10-CM

## 2018-11-06 PROCEDURE — 93000 ELECTROCARDIOGRAM COMPLETE: CPT | Performed by: INTERNAL MEDICINE

## 2018-11-06 PROCEDURE — 1036F TOBACCO NON-USER: CPT | Performed by: INTERNAL MEDICINE

## 2018-11-06 PROCEDURE — 99214 OFFICE O/P EST MOD 30 MIN: CPT | Performed by: INTERNAL MEDICINE

## 2018-11-06 PROCEDURE — 4040F PNEUMOC VAC/ADMIN/RCVD: CPT | Performed by: INTERNAL MEDICINE

## 2018-11-06 PROCEDURE — 1123F ACP DISCUSS/DSCN MKR DOCD: CPT | Performed by: INTERNAL MEDICINE

## 2018-11-06 PROCEDURE — 1101F PT FALLS ASSESS-DOCD LE1/YR: CPT | Performed by: INTERNAL MEDICINE

## 2018-11-06 PROCEDURE — G8420 CALC BMI NORM PARAMETERS: HCPCS | Performed by: INTERNAL MEDICINE

## 2018-11-06 PROCEDURE — G8427 DOCREV CUR MEDS BY ELIG CLIN: HCPCS | Performed by: INTERNAL MEDICINE

## 2018-11-06 PROCEDURE — G8484 FLU IMMUNIZE NO ADMIN: HCPCS | Performed by: INTERNAL MEDICINE

## 2018-11-06 PROCEDURE — G8598 ASA/ANTIPLAT THER USED: HCPCS | Performed by: INTERNAL MEDICINE

## 2018-11-06 NOTE — PROGRESS NOTES
daily       metoprolol succinate (TOPROL XL) 100 MG extended release tablet Take 1 tablet by mouth daily 30 tablet 3    finasteride (PROSCAR) 5 MG tablet Take 5 mg by mouth nightly       Multiple Vitamin (MULTIVITAMIN PO) Take 1 tablet by mouth daily       atorvastatin (LIPITOR) 40 MG tablet Take 1 tablet by mouth daily. 30 tablet 3    potassium chloride SA (K-DUR;KLOR-CON) 20 MEQ tablet Take 1 tablet by mouth daily (with breakfast). 60 tablet 3     No current facility-administered medications for this visit.       Physical Exam:  /60   Pulse 58   Resp 18   Ht 5' 8\" (1.727 m)   Wt 149 lb 12.8 oz (67.9 kg)   BMI 22.78 kg/m²   Wt Readings from Last 3 Encounters:   11/06/18 149 lb 12.8 oz (67.9 kg)   08/01/18 147 lb (66.7 kg)   07/18/18 148 lb 8 oz (67.4 kg)     Appearance: Awake, no acute respiratory distress   Skin: Intact, stasis dermatitis changes  Head: Normocephalic, atraumatic   Eyes: EOMI, no conjunctival erythema   ENMT: No pharyngeal erythema, MMM, no rhinorrhea   Neck: Supple, no apparent elevated JVP, no carotid bruits   Lungs: Decreased BS B/L, no wheezing  Cardiac: Regular rate and rhythm, +S1S2, 2/6 HSM at apex   Abdomen: Soft, nontender, +bowel sounds   Extremities: Moves all extremities x 4, no LE edema  Neurologic: No focal motor deficits apparent, normal mood and affect     Laboratory Tests:  Lab Results   Component Value Date    CREATININE 1.3 (H) 07/18/2018    BUN 23 07/18/2018     07/18/2018    K 4.3 07/18/2018     07/18/2018    CO2 23 07/18/2018     Lab Results   Component Value Date    MG 2.2 07/13/2018     Lab Results   Component Value Date    ALT 12 07/12/2018    AST 16 07/12/2018    ALKPHOS 56 07/12/2018    BILITOT 1.2 07/12/2018     Lab Results   Component Value Date    WBC 5.5 07/15/2018    HGB 10.6 (L) 07/15/2018    HCT 32.1 (L) 07/15/2018    MCV 89.7 07/15/2018     07/15/2018     Lab Results   Component Value Date    CKTOTAL 102 12/03/2017    [facility] 5.3 12/03/2017    TROPONINI 0.05 (H) 07/12/2018    TROPONINI 0.07 (H) 12/03/2017    TROPONINI 0.59 (H) 10/09/2012     Lab Results   Component Value Date    INR 1.0 07/12/2018    INR 1.1 12/03/2017    INR 1.0 10/07/2012    PROTIME 11.5 07/12/2018    PROTIME 11.4 12/03/2017    PROTIME 11.3 10/07/2012     Lab Results   Component Value Date    TSH 0.487 10/08/2012     Lab Results   Component Value Date    CHOL 151 10/08/2012     Lab Results   Component Value Date    TRIG 59 10/08/2012     Lab Results   Component Value Date    HDL 62.8 10/08/2012     Lab Results   Component Value Date    LDLCALC 76 10/08/2012     Cardiac Tests:  EKG: SB, rate 58, LAFB, NSSTT changes     Echocardiogram: 10/8/12   Left ventricle is moderately enlarged. Severely reduced left ventricular systolic function. Ejection fraction is visually estimated at 25 +/- 5%. Elevated E/E' suggestive of elevated left atrial pressure. Normal right ventricular size and function. Moderately calcified mitral valve leaflets. Mild mitral regurgitation is present. Moderate fibrocalcific changes of the aortic valve leaflets. Mild-to-moderate aortic regurgitation is noted. Trace tricuspid regurgitation. PASP is estimated at 21 mmHg.     Echocardiogram: 12/4/17 ([place])   Severely reduced left ventricular systolic function.   Ejection fraction is visually estimated at 25-30%.   Normal right ventricular size and function (TAPSE 2.4 cm).   There is doppler evidence of stage II diastolic dysfunction.   Mild mitral regurgitation.   Mild-moderate aortic regurgitation.   Mild tricuspid regurgitation.   PASP is estimated at 46 mmHg.   Mild pulmonic regurgitation.   Left-sided pleural effusion.     ASSESSMENT / PLAN:  1. Chronic systolic/diastolic CHF (hospitlaized in 12/2017 for CHF exacerbation -- clinically improved)  2. Acute on CKD -- Cr 1.1 in 12/2017 --> Cr 3.0 in 7/2018 with elevated K --> most recent Cr 1.3  3. CAD -- medically managed  4.  History of

## 2019-05-10 ENCOUNTER — OFFICE VISIT (OUTPATIENT)
Dept: CARDIOLOGY CLINIC | Age: 84
End: 2019-05-10
Payer: MEDICARE

## 2019-05-10 VITALS
HEIGHT: 67 IN | HEART RATE: 62 BPM | RESPIRATION RATE: 16 BRPM | WEIGHT: 156.6 LBS | DIASTOLIC BLOOD PRESSURE: 60 MMHG | SYSTOLIC BLOOD PRESSURE: 122 MMHG | BODY MASS INDEX: 24.58 KG/M2

## 2019-05-10 DIAGNOSIS — I50.42 CHRONIC COMBINED SYSTOLIC (CONGESTIVE) AND DIASTOLIC (CONGESTIVE) HEART FAILURE (HCC): Primary | ICD-10-CM

## 2019-05-10 DIAGNOSIS — I38 VALVULAR HEART DISEASE: ICD-10-CM

## 2019-05-10 DIAGNOSIS — I44.4 LAFB (LEFT ANTERIOR FASCICULAR BLOCK): ICD-10-CM

## 2019-05-10 DIAGNOSIS — I25.10 CORONARY ARTERY DISEASE INVOLVING NATIVE CORONARY ARTERY OF NATIVE HEART WITHOUT ANGINA PECTORIS: ICD-10-CM

## 2019-05-10 DIAGNOSIS — I10 ESSENTIAL HYPERTENSION: ICD-10-CM

## 2019-05-10 DIAGNOSIS — I50.9 CONGESTIVE HEART FAILURE, UNSPECIFIED HF CHRONICITY, UNSPECIFIED HEART FAILURE TYPE (HCC): ICD-10-CM

## 2019-05-10 PROCEDURE — G8427 DOCREV CUR MEDS BY ELIG CLIN: HCPCS | Performed by: INTERNAL MEDICINE

## 2019-05-10 PROCEDURE — 99214 OFFICE O/P EST MOD 30 MIN: CPT | Performed by: INTERNAL MEDICINE

## 2019-05-10 PROCEDURE — 1036F TOBACCO NON-USER: CPT | Performed by: INTERNAL MEDICINE

## 2019-05-10 PROCEDURE — 4040F PNEUMOC VAC/ADMIN/RCVD: CPT | Performed by: INTERNAL MEDICINE

## 2019-05-10 PROCEDURE — 1123F ACP DISCUSS/DSCN MKR DOCD: CPT | Performed by: INTERNAL MEDICINE

## 2019-05-10 PROCEDURE — G8598 ASA/ANTIPLAT THER USED: HCPCS | Performed by: INTERNAL MEDICINE

## 2019-05-10 PROCEDURE — 93000 ELECTROCARDIOGRAM COMPLETE: CPT | Performed by: INTERNAL MEDICINE

## 2019-05-10 PROCEDURE — G8420 CALC BMI NORM PARAMETERS: HCPCS | Performed by: INTERNAL MEDICINE

## 2019-05-10 NOTE — PROGRESS NOTES
OUTPATIENT CARDIOLOGY FOLLOW-UP    Name: Otilia Andino    Age: 80 y.o. Date of Service: 5/10/2019    Chief Complaint: Follow-up for chronic systolic/diastolic CHF, CAD, UZIEL    Interim History:  +extreme weakness on 7/12/18 prior to hospitalization (+SOB in the setting of weakness). No exertional chest pain. Compliant with taking his medications. +UZIEL in 7/2018 --> improved. No new cardiac complaints since hospital discharge. No chest pain or worsening SOB. Energy level improved. SR on EKG. Review of Systems:   Cardiac: As per HPI  General: No fever, chills  Pulmonary: As per HPI  HEENT: No visual disturbances, difficult swallowing  GI: No nausea, vomiting  Musculoskeletal: COREA x 4  Skin: Intact, +stasis dermatitis changes  Neuro/Psych: No headache, focal motor deficits    Problem List:  Patient Active Problem List   Diagnosis    Pneumonia due to organism    [facility])    Hypoxia    HTN (hypertension)    Valvular heart disease    Chronic systolic CHF (congestive heart failure) ([facility])    LAFB (left anterior fascicular block)    Dyspnea    Short of breath on exertion    Shortness of breath    [facility] [address]    Acute kidney injury (nontraumatic) ([facility])    Chronic combined gastric and duodenal ulcer    Coronary artery disease involving native coronary artery of native heart without angina pectoris       Allergies:  No Known Allergies    Current Medications:  Current Outpatient Medications   Medication Sig Dispense Refill    nitroGLYCERIN (NITROSTAT) 0.4 MG SL tablet up to max of 3 total doses.  If no relief after 1 dose, call 911. 25 tablet 3    lisinopril (PRINIVIL;ZESTRIL) 10 MG tablet Take 1 tablet by mouth daily 30 tablet 3    furosemide (LASIX) 20 MG tablet Take 1 tablet by mouth daily 60 tablet 3    isosorbide mononitrate (IMDUR) 30 MG extended release tablet Take 30 mg by mouth daily      aspirin EC 81 MG EC tablet Take 81 mg by mouth daily TROPONINI 0.07 (H) 12/03/2017    TROPONINI 0.59 (H) 10/09/2012     Lab Results   Component Value Date    INR 1.0 07/12/2018    INR 1.1 12/03/2017    INR 1.0 10/07/2012    PROTIME 11.5 07/12/2018    PROTIME 11.4 12/03/2017    PROTIME 11.3 10/07/2012     Lab Results   Component Value Date    TSH 0.487 10/08/2012     Lab Results   Component Value Date    CHOL 151 10/08/2012     Lab Results   Component Value Date    TRIG 59 10/08/2012     Lab Results   Component Value Date    HDL 62.8 10/08/2012     Lab Results   Component Value Date    LDLCALC 76 10/08/2012     Cardiac Tests:  EKG: SR, rate 62, LAFB     Echocardiogram: 10/8/12   Left ventricle is moderately enlarged. Severely reduced left ventricular systolic function. Ejection fraction is visually estimated at 25 +/- 5%. Elevated E/E' suggestive of elevated left atrial pressure. Normal right ventricular size and function. Moderately calcified mitral valve leaflets. Mild mitral regurgitation is present. Moderate fibrocalcific changes of the aortic valve leaflets. Mild-to-moderate aortic regurgitation is noted. Trace tricuspid regurgitation. PASP is estimated at 21 mmHg.     Echocardiogram: 12/4/17 ([place])   Severely reduced left ventricular systolic function.   Ejection fraction is visually estimated at 25-30%.   Normal right ventricular size and function (TAPSE 2.4 cm).   There is doppler evidence of stage II diastolic dysfunction.   Mild mitral regurgitation.   Mild-moderate aortic regurgitation.   Mild tricuspid regurgitation.   PASP is estimated at 46 mmHg.   Mild pulmonic regurgitation.   Left-sided pleural effusion.     ASSESSMENT / PLAN:  1. Chronic systolic/diastolic CHF (hospitlaized in 12/2017 for CHF exacerbation -- clinically improved)  2. Acute on CKD -- Cr 1.1 in 12/2017 --> Cr 3.0 in 7/2018 with elevated K --> most recent Cr 1.3  3. CAD -- medically managed  4.  History of hypoxemic respiratory failure in 10/2012 -- likely multifactorial (PNA, heart failure, elevated cardiac enzymes -- he opted for noninvasive cardiac management at that time)  5. HTN - typically controlled, BP today 122/60  6. HLD - on statin  7. Mild-moderate valve disease in 10/2012 (similar findings on 12/4/17 echocardiogram)  8. Chronic LAFB     Plan:  1. Continue Toprol XL and ACE-I  2. Continue imdur  3. Aldactone discontinued in 7/2018  4. Monitor renal function and electrolytes on lasix (discussed re: taking extra lasix PRN)  5. Continue ASA, statin  6. Results of repeat echocardiogram (12/4/17) outlined above --> similar findings to 2012 echcoardiogram  7. Continue with conservative/non-invasive management per patient request  8.  The above was discussed with the patient and his son again today     Satinder Keene MD  [facility]) Cardiology

## 2019-12-04 ENCOUNTER — OFFICE VISIT (OUTPATIENT)
Dept: CARDIOLOGY CLINIC | Age: 84
End: 2019-12-04
Payer: MEDICARE

## 2019-12-04 VITALS
DIASTOLIC BLOOD PRESSURE: 68 MMHG | RESPIRATION RATE: 12 BRPM | BODY MASS INDEX: 23.07 KG/M2 | HEIGHT: 67 IN | HEART RATE: 67 BPM | WEIGHT: 147 LBS | SYSTOLIC BLOOD PRESSURE: 120 MMHG

## 2019-12-04 DIAGNOSIS — I50.9 CONGESTIVE HEART FAILURE, UNSPECIFIED HF CHRONICITY, UNSPECIFIED HEART FAILURE TYPE (HCC): ICD-10-CM

## 2019-12-04 DIAGNOSIS — I44.4 LAFB (LEFT ANTERIOR FASCICULAR BLOCK): ICD-10-CM

## 2019-12-04 DIAGNOSIS — I25.10 CORONARY ARTERY DISEASE INVOLVING NATIVE CORONARY ARTERY OF NATIVE HEART WITHOUT ANGINA PECTORIS: ICD-10-CM

## 2019-12-04 DIAGNOSIS — I10 ESSENTIAL HYPERTENSION: ICD-10-CM

## 2019-12-04 DIAGNOSIS — I50.42 CHRONIC COMBINED SYSTOLIC (CONGESTIVE) AND DIASTOLIC (CONGESTIVE) HEART FAILURE (HCC): Primary | ICD-10-CM

## 2019-12-04 DIAGNOSIS — I38 VALVULAR HEART DISEASE: ICD-10-CM

## 2019-12-04 PROCEDURE — G8427 DOCREV CUR MEDS BY ELIG CLIN: HCPCS | Performed by: INTERNAL MEDICINE

## 2019-12-04 PROCEDURE — 4040F PNEUMOC VAC/ADMIN/RCVD: CPT | Performed by: INTERNAL MEDICINE

## 2019-12-04 PROCEDURE — 93000 ELECTROCARDIOGRAM COMPLETE: CPT | Performed by: INTERNAL MEDICINE

## 2019-12-04 PROCEDURE — G8484 FLU IMMUNIZE NO ADMIN: HCPCS | Performed by: INTERNAL MEDICINE

## 2019-12-04 PROCEDURE — 1123F ACP DISCUSS/DSCN MKR DOCD: CPT | Performed by: INTERNAL MEDICINE

## 2019-12-04 PROCEDURE — G8598 ASA/ANTIPLAT THER USED: HCPCS | Performed by: INTERNAL MEDICINE

## 2019-12-04 PROCEDURE — 1036F TOBACCO NON-USER: CPT | Performed by: INTERNAL MEDICINE

## 2019-12-04 PROCEDURE — G8420 CALC BMI NORM PARAMETERS: HCPCS | Performed by: INTERNAL MEDICINE

## 2019-12-04 PROCEDURE — 99214 OFFICE O/P EST MOD 30 MIN: CPT | Performed by: INTERNAL MEDICINE

## 2020-03-25 PROBLEM — N17.9 AKI (ACUTE KIDNEY INJURY) (HCC): Status: RESOLVED | Noted: 2018-07-12 | Resolved: 2020-03-24

## 2020-07-10 ENCOUNTER — APPOINTMENT (OUTPATIENT)
Dept: GENERAL RADIOLOGY | Age: 85
End: 2020-07-10
Payer: MEDICARE

## 2020-07-10 ENCOUNTER — HOSPITAL ENCOUNTER (OUTPATIENT)
Age: 85
Setting detail: OBSERVATION
Discharge: HOME | End: 2020-07-12
Attending: EMERGENCY MEDICINE | Admitting: GENERAL PRACTICE
Payer: MEDICARE

## 2020-07-10 ENCOUNTER — APPOINTMENT (OUTPATIENT)
Dept: CT IMAGING | Age: 85
End: 2020-07-10
Payer: MEDICARE

## 2020-07-10 PROBLEM — I42.9 CONGESTIVE HEART FAILURE WITH CARDIOMYOPATHY (HCC): Status: ACTIVE | Noted: 2020-07-10

## 2020-07-10 PROBLEM — I50.9 CONGESTIVE HEART FAILURE WITH CARDIOMYOPATHY (HCC): Status: ACTIVE | Noted: 2020-07-10

## 2020-07-10 LAB
ALBUMIN SERPL-MCNC: 3.3 G/DL (ref 3.5–5.2)
ALP BLD-CCNC: 72 U/L (ref 40–129)
ALT SERPL-CCNC: 14 U/L (ref 0–40)
ANION GAP SERPL CALCULATED.3IONS-SCNC: 9 MMOL/L (ref 7–16)
AST SERPL-CCNC: 30 U/L (ref 0–39)
BASOPHILS ABSOLUTE: 0.05 E9/L (ref 0–0.2)
BASOPHILS RELATIVE PERCENT: 0.8 % (ref 0–2)
BILIRUB SERPL-MCNC: 1 MG/DL (ref 0–1.2)
BUN BLDV-MCNC: 27 MG/DL (ref 8–23)
CALCIUM SERPL-MCNC: 9 MG/DL (ref 8.6–10.2)
CHLORIDE BLD-SCNC: 104 MMOL/L (ref 98–107)
CO2: 24 MMOL/L (ref 22–29)
CREAT SERPL-MCNC: 1.5 MG/DL (ref 0.7–1.2)
EKG ATRIAL RATE: 71 BPM
EKG ATRIAL RATE: 83 BPM
EKG P AXIS: 21 DEGREES
EKG P AXIS: 23 DEGREES
EKG P-R INTERVAL: 194 MS
EKG P-R INTERVAL: 200 MS
EKG Q-T INTERVAL: 426 MS
EKG Q-T INTERVAL: 446 MS
EKG QRS DURATION: 136 MS
EKG QRS DURATION: 140 MS
EKG QTC CALCULATION (BAZETT): 484 MS
EKG QTC CALCULATION (BAZETT): 500 MS
EKG R AXIS: -58 DEGREES
EKG R AXIS: -67 DEGREES
EKG T AXIS: 119 DEGREES
EKG T AXIS: 99 DEGREES
EKG VENTRICULAR RATE: 71 BPM
EKG VENTRICULAR RATE: 83 BPM
EOSINOPHILS ABSOLUTE: 0.06 E9/L (ref 0.05–0.5)
EOSINOPHILS RELATIVE PERCENT: 1 % (ref 0–6)
GFR AFRICAN AMERICAN: 52
GFR NON-AFRICAN AMERICAN: 43 ML/MIN/1.73
GLUCOSE BLD-MCNC: 140 MG/DL (ref 74–99)
HCT VFR BLD CALC: 38.9 % (ref 37–54)
HEMOGLOBIN: 12.6 G/DL (ref 12.5–16.5)
IMMATURE GRANULOCYTES #: 0.01 E9/L
IMMATURE GRANULOCYTES %: 0.2 % (ref 0–5)
LYMPHOCYTES ABSOLUTE: 0.99 E9/L (ref 1.5–4)
LYMPHOCYTES RELATIVE PERCENT: 16.4 % (ref 20–42)
MCH RBC QN AUTO: 29.6 PG (ref 26–35)
MCHC RBC AUTO-ENTMCNC: 32.4 % (ref 32–34.5)
MCV RBC AUTO: 91.5 FL (ref 80–99.9)
MONOCYTES ABSOLUTE: 0.44 E9/L (ref 0.1–0.95)
MONOCYTES RELATIVE PERCENT: 7.3 % (ref 2–12)
NEUTROPHILS ABSOLUTE: 4.49 E9/L (ref 1.8–7.3)
NEUTROPHILS RELATIVE PERCENT: 74.3 % (ref 43–80)
PDW BLD-RTO: 13.6 FL (ref 11.5–15)
PLATELET # BLD: 210 E9/L (ref 130–450)
PMV BLD AUTO: 10.6 FL (ref 7–12)
POTASSIUM REFLEX MAGNESIUM: 4.7 MMOL/L (ref 3.5–5)
PRO-BNP: 7328 PG/ML (ref 0–450)
RBC # BLD: 4.25 E12/L (ref 3.8–5.8)
SODIUM BLD-SCNC: 137 MMOL/L (ref 132–146)
TOTAL PROTEIN: 6.5 G/DL (ref 6.4–8.3)
TROPONIN: 0.16 NG/ML (ref 0–0.03)
TROPONIN: 0.17 NG/ML (ref 0–0.03)
WBC # BLD: 6 E9/L (ref 4.5–11.5)

## 2020-07-10 PROCEDURE — 93005 ELECTROCARDIOGRAM TRACING: CPT | Performed by: EMERGENCY MEDICINE

## 2020-07-10 PROCEDURE — 6360000002 HC RX W HCPCS: Performed by: STUDENT IN AN ORGANIZED HEALTH CARE EDUCATION/TRAINING PROGRAM

## 2020-07-10 PROCEDURE — 93010 ELECTROCARDIOGRAM REPORT: CPT | Performed by: INTERNAL MEDICINE

## 2020-07-10 PROCEDURE — 71045 X-RAY EXAM CHEST 1 VIEW: CPT

## 2020-07-10 PROCEDURE — 83880 ASSAY OF NATRIURETIC PEPTIDE: CPT

## 2020-07-10 PROCEDURE — 70450 CT HEAD/BRAIN W/O DYE: CPT

## 2020-07-10 PROCEDURE — 6370000000 HC RX 637 (ALT 250 FOR IP): Performed by: STUDENT IN AN ORGANIZED HEALTH CARE EDUCATION/TRAINING PROGRAM

## 2020-07-10 PROCEDURE — 85025 COMPLETE CBC W/AUTO DIFF WBC: CPT

## 2020-07-10 PROCEDURE — 84484 ASSAY OF TROPONIN QUANT: CPT

## 2020-07-10 PROCEDURE — G0378 HOSPITAL OBSERVATION PER HR: HCPCS

## 2020-07-10 PROCEDURE — 6370000000 HC RX 637 (ALT 250 FOR IP): Performed by: GENERAL PRACTICE

## 2020-07-10 PROCEDURE — 96372 THER/PROPH/DIAG INJ SC/IM: CPT

## 2020-07-10 PROCEDURE — 99285 EMERGENCY DEPT VISIT HI MDM: CPT

## 2020-07-10 PROCEDURE — 96374 THER/PROPH/DIAG INJ IV PUSH: CPT

## 2020-07-10 PROCEDURE — 93005 ELECTROCARDIOGRAM TRACING: CPT | Performed by: STUDENT IN AN ORGANIZED HEALTH CARE EDUCATION/TRAINING PROGRAM

## 2020-07-10 PROCEDURE — 80053 COMPREHEN METABOLIC PANEL: CPT

## 2020-07-10 PROCEDURE — 6360000002 HC RX W HCPCS: Performed by: GENERAL PRACTICE

## 2020-07-10 PROCEDURE — 36415 COLL VENOUS BLD VENIPUNCTURE: CPT

## 2020-07-10 RX ORDER — FUROSEMIDE 10 MG/ML
20 INJECTION INTRAMUSCULAR; INTRAVENOUS DAILY
Status: DISCONTINUED | OUTPATIENT
Start: 2020-07-11 | End: 2020-07-11

## 2020-07-10 RX ORDER — NITROGLYCERIN 0.4 MG/1
0.4 TABLET SUBLINGUAL EVERY 5 MIN PRN
Status: DISCONTINUED | OUTPATIENT
Start: 2020-07-10 | End: 2020-07-12 | Stop reason: HOSPADM

## 2020-07-10 RX ORDER — ASPIRIN 81 MG/1
81 TABLET ORAL DAILY
Status: DISCONTINUED | OUTPATIENT
Start: 2020-07-10 | End: 2020-07-12 | Stop reason: HOSPADM

## 2020-07-10 RX ORDER — LISINOPRIL 10 MG/1
10 TABLET ORAL DAILY
Status: DISCONTINUED | OUTPATIENT
Start: 2020-07-10 | End: 2020-07-12 | Stop reason: HOSPADM

## 2020-07-10 RX ORDER — POTASSIUM CHLORIDE 20 MEQ/1
20 TABLET, EXTENDED RELEASE ORAL
Status: DISCONTINUED | OUTPATIENT
Start: 2020-07-11 | End: 2020-07-12 | Stop reason: HOSPADM

## 2020-07-10 RX ORDER — METOPROLOL SUCCINATE 100 MG/1
100 TABLET, EXTENDED RELEASE ORAL DAILY
Status: DISCONTINUED | OUTPATIENT
Start: 2020-07-10 | End: 2020-07-12 | Stop reason: HOSPADM

## 2020-07-10 RX ORDER — ASPIRIN 81 MG/1
243 TABLET, CHEWABLE ORAL ONCE
Status: COMPLETED | OUTPATIENT
Start: 2020-07-10 | End: 2020-07-10

## 2020-07-10 RX ORDER — ATORVASTATIN CALCIUM 40 MG/1
40 TABLET, FILM COATED ORAL DAILY
Status: DISCONTINUED | OUTPATIENT
Start: 2020-07-10 | End: 2020-07-12 | Stop reason: HOSPADM

## 2020-07-10 RX ORDER — ISOSORBIDE MONONITRATE 30 MG/1
30 TABLET, EXTENDED RELEASE ORAL DAILY
Status: DISCONTINUED | OUTPATIENT
Start: 2020-07-10 | End: 2020-07-12 | Stop reason: HOSPADM

## 2020-07-10 RX ORDER — MULTIVITAMIN WITH IRON
1 TABLET ORAL DAILY
Status: DISCONTINUED | OUTPATIENT
Start: 2020-07-10 | End: 2020-07-12 | Stop reason: HOSPADM

## 2020-07-10 RX ORDER — FINASTERIDE 5 MG/1
5 TABLET, FILM COATED ORAL DAILY
Status: DISCONTINUED | OUTPATIENT
Start: 2020-07-10 | End: 2020-07-12 | Stop reason: HOSPADM

## 2020-07-10 RX ORDER — FUROSEMIDE 10 MG/ML
20 INJECTION INTRAMUSCULAR; INTRAVENOUS ONCE
Status: COMPLETED | OUTPATIENT
Start: 2020-07-10 | End: 2020-07-10

## 2020-07-10 RX ADMIN — FINASTERIDE 5 MG: 5 TABLET, FILM COATED ORAL at 19:36

## 2020-07-10 RX ADMIN — LISINOPRIL 10 MG: 10 TABLET ORAL at 19:36

## 2020-07-10 RX ADMIN — ATORVASTATIN CALCIUM 40 MG: 40 TABLET, FILM COATED ORAL at 19:36

## 2020-07-10 RX ADMIN — ASPIRIN 81 MG 243 MG: 81 TABLET ORAL at 14:17

## 2020-07-10 RX ADMIN — FUROSEMIDE 20 MG: 10 INJECTION, SOLUTION INTRAVENOUS at 15:56

## 2020-07-10 RX ADMIN — ISOSORBIDE MONONITRATE 30 MG: 30 TABLET, EXTENDED RELEASE ORAL at 19:36

## 2020-07-10 RX ADMIN — ENOXAPARIN SODIUM 30 MG: 30 INJECTION SUBCUTANEOUS at 20:40

## 2020-07-10 RX ADMIN — ASPIRIN 81 MG: 81 TABLET, COATED ORAL at 19:36

## 2020-07-10 RX ADMIN — MULTIVITAMIN TABLET 1 TABLET: TABLET at 19:36

## 2020-07-10 RX ADMIN — METOPROLOL SUCCINATE 100 MG: 100 TABLET, EXTENDED RELEASE ORAL at 19:36

## 2020-07-10 ASSESSMENT — ENCOUNTER SYMPTOMS
SHORTNESS OF BREATH: 1
APNEA: 0
DIARRHEA: 0
EYE REDNESS: 0
SORE THROAT: 0
ABDOMINAL PAIN: 0
WHEEZING: 0
EYE PAIN: 0
PHOTOPHOBIA: 0
COUGH: 0
CONSTIPATION: 0
VOMITING: 0
NAUSEA: 0
BACK PAIN: 0
RHINORRHEA: 0
TROUBLE SWALLOWING: 0
CHEST TIGHTNESS: 0

## 2020-07-10 ASSESSMENT — PAIN SCALES - GENERAL
PAINLEVEL_OUTOF10: 0
PAINLEVEL_OUTOF10: 0

## 2020-07-10 NOTE — PROGRESS NOTES
Spoke to Dr. Linh Yao re: admission orders. Orders received.  Electronically signed by Annette Pitts on 7/10/2020 at 6:19 PM

## 2020-07-10 NOTE — ED PROVIDER NOTES
[facility] ENCOUNTER      Pt Name: Richard Silver  MRN: [id number]  [place] 4/27/1922  Date of evaluation: 7/10/2020      CHIEF COMPLAINT       Chief Complaint   Patient presents with    Leg Swelling     Bilat leg swelling with SOB- hx CHF     Shortness of Breath    Fall     Multiple falls in last 2 days with no injuries         HPI  Richard Silver is a 80 y.o. male with a history of CHF with ejection fracture of 20%, hypertension, who presents to the emergency department with bilateral lower extremity swelling shortness of breath and falls. Patient reports that the last 2 weeks or so he has had gradual increasing bilateral lower extremity edema. For last week or so he has noticed increased shortness of breath. He describes his shortness of breath is mild to moderate, intermittent, worse with exertion, better at rest.  He denies orthopnea. He states that he has been spending quite a bit of time out in the sun and sunbathing. Yesterday he had a fall where he felt just increased generalized weakness and then still he fell to the ground. He did not hit his head. Denies any pain or injury from the fall. Denies any head pain, neck pain, back pain, hip pain        Except as noted above the remainder of the review of systems was reviewed and negative. Review of Systems   Constitutional: Positive for fatigue. Negative for activity change, chills, diaphoresis and fever. HENT: Negative for rhinorrhea, sore throat and trouble swallowing. Eyes: Negative for photophobia, pain and redness. Respiratory: Positive for shortness of breath. Negative for apnea, cough, chest tightness and wheezing. Cardiovascular: Positive for leg swelling. Negative for chest pain and palpitations. Gastrointestinal: Negative for abdominal pain, constipation, diarrhea, nausea and vomiting. Endocrine: Negative for polyuria.    Genitourinary: Negative for difficulty urinating and dysuria. Musculoskeletal: Negative for back pain, neck pain and neck stiffness. Skin: Negative for pallor and rash. Neurological: Negative for dizziness, syncope, weakness, light-headedness and numbness. Psychiatric/Behavioral: Negative for confusion. The patient is not nervous/anxious. Physical Exam  Vitals signs and nursing note reviewed. Constitutional:       General: He is not in acute distress. Appearance: He is well-developed. Comments: Awake and alert. Sitting in the gurney in no obvious distress. HENT:      Head: Normocephalic and atraumatic. Mouth/Throat:      Mouth: Mucous membranes are moist.      Pharynx: No oropharyngeal exudate. Eyes:      General: No scleral icterus. Pupils: Pupils are equal, round, and reactive to light. Neck:      Musculoskeletal: Normal range of motion and neck supple. Cardiovascular:      Rate and Rhythm: Normal rate and regular rhythm. Heart sounds: Normal heart sounds. No murmur. Comments: 2+ radial and dorsal pedis pulses bilaterally. .  Pulmonary:      Effort: Pulmonary effort is normal. No respiratory distress. Breath sounds: Normal breath sounds. No wheezing. Abdominal:      Palpations: Abdomen is soft. Tenderness: There is no abdominal tenderness. There is no guarding or rebound. Musculoskeletal: Normal range of motion. General: No tenderness or deformity. Right lower leg: Edema present. Left lower leg: Edema present. Skin:     General: Skin is warm and dry. Capillary Refill: Capillary refill takes less than 2 seconds. Findings: No rash. Neurological:      General: No focal deficit present. Mental Status: He is alert and oriented to person, place, and time. Cranial Nerves: No cranial nerve deficit. Sensory: No sensory deficit. Motor: No weakness or abnormal muscle tone.    Psychiatric:         Mood and Affect: Mood normal.         Behavior: Behavior normal.          Procedures     MDM   This is a 70-year-old male with a history of CHF with ejection fraction of 20%, hypertension, who presents to the emergency department with increased bilateral lower extremity swelling as well as dyspnea on exertion. In the emergency department he is awake and alert, hemodynamically stable, afebrile and in no respiratory distress. He does have bilateral lower extremity edema. EKG showed lateral ST depressions which seem to be consistent with previous EKG, right bundle branch block, no clear signs of new ischemia. Troponin however was elevated at 0.16. Administered aspirin 324 mg in the emergency department. Consulted cardiology who was able to evaluate the patient in the emergency department and did not recommend heparinization nor Plavix. Patient is DNR CCA and does not want further cardiac intervention. Patient does not need to be transferred to a cardiac center. BNP significantly elevated at 7300. First dose of Lasix 20 mg IV was administered. Creatinine is 1.5, slightly elevated compared to previous. Electrolytes otherwise normal.  Chest x-ray showed no evidence of fluid overload, no leukocytosis, no signs of pneumonia, no signs of infectious etiology of his symptoms today. Discussed the case with Dr. Tre Serna, who accept the patient for further evaluation. Likely CHF exacerbation causing most of his symptoms here today. Possible NSTEMI with his elevation in troponin. Cardiology will follow while he is in the hospital.    ED Course as of Jul 10 1927   Fri Jul 10, 2020   1414 Troponin(!): 0.16 [MF]   [phone] Cardiology at bedside    [MF]   Vene 89:     I have personally performed and/or participated in the history, exam, medical decision making, and procedures and agree with all pertinent clinical information unless otherwise noted.     I have also reviewed and agree with the past medical, family and social history unless otherwise noted. I have discussed this patient in detail with the resident and provided the instruction and education regarding the evidence-based evaluation and treatment of [unfilled]  History: 25-year-old male with a history of CHF presents for several days of worsening shortness of breath and leg swelling    My findings: Bety Horner is a 80 y.o. male whom is in no distress. Physical exam reveals bilateral peripheral edema. My plan: Symptomatic and supportive care. Aspirin, cardiology consult    Electronically signed by Shae Bush DO on 7/10/20 at 2:45 PM EDT          [MF]   1028 On-call cardiology was able to evaluate the patient in the emergency department. Repeated EKG. Recommended just full dose aspirin and admission for diuresis. Do not administer heparin at this point. Patient DNR CCA. The patient will not be transferred downtown per cardiology recommendation and that the patient would not want any procedure anyway. [LM]      ED Course User Index  [LM] Gibran Guevara DO  [MF] Shae Bush DO         EKG: This EKG is signed and interpreted by me. Rate: 71bpm  Rhythm: Sinus with frequent PVCs  Interpretation: Leftward axis. Mildly prolonged QTC of 484 ms. Normal [place] intervals 194 ms. Frequent PVCs. ST depression in V5 V6 lead I. Inverted T wave in aVL. No ST segment elevation. Comparison: Compared to EKG December 4, 2019 inverted T wave in aVL is unchanged, ST depression in the lateral leads seen previously though possibly mildly more prominent today.         --------------------------------------------- PAST HISTORY ---------------------------------------------  Past Medical History:  has a past medical history of Cancer ([facility] [address] CHF (congestive heart failure) ([facility] [address] Hyperlipidemia, Hypertension, and Kidney failure.     Past Surgical History:  has a past surgical history that includes Colon surgery; hernia repair; and ECHO Complete 2D W Doppler W Color (10/8/2012). Social History:  reports that he has never smoked. He has never used smokeless tobacco. He reports that he does not drink alcohol or use drugs. Family History: family history is not on file. He was adopted. The patients home medications have been reviewed. Allergies: Patient has no known allergies.     -------------------------------------------------- RESULTS -------------------------------------------------    LABS:  Results for orders placed or performed during the hospital encounter of 07/10/20   CBC Auto Differential   Result Value Ref Range    WBC 6.0 4.5 - 11.5 E9/L    RBC 4.25 3.80 - 5.80 E12/L    Hemoglobin 12.6 12.5 - 16.5 g/dL    Hematocrit 38.9 37.0 - 54.0 %    MCV 91.5 80.0 - 99.9 fL    MCH 29.6 26.0 - 35.0 pg    MCHC 32.4 32.0 - 34.5 %    RDW 13.6 11.5 - 15.0 fL    Platelets 384 382 - 336 E9/L    MPV 10.6 7.0 - 12.0 fL    Neutrophils % 74.3 43.0 - 80.0 %    Immature Granulocytes % 0.2 0.0 - 5.0 %    Lymphocytes % 16.4 (L) 20.0 - 42.0 %    Monocytes % 7.3 2.0 - 12.0 %    Eosinophils % 1.0 0.0 - 6.0 %    Basophils % 0.8 0.0 - 2.0 %    Neutrophils Absolute 4.49 1.80 - 7.30 E9/L    Immature Granulocytes # 0.01 E9/L    Lymphocytes Absolute 0.99 (L) 1.50 - 4.00 E9/L    Monocytes Absolute 0.44 0.10 - 0.95 E9/L    Eosinophils Absolute 0.06 0.05 - 0.50 E9/L    Basophils Absolute 0.05 0.00 - 0.20 E9/L   Comprehensive Metabolic Panel w/ Reflex to MG   Result Value Ref Range    Sodium 137 132 - 146 mmol/L    Potassium reflex Magnesium 4.7 3.5 - 5.0 mmol/L    Chloride 104 98 - 107 mmol/L    CO2 24 22 - 29 mmol/L    Anion Gap 9 7 - 16 mmol/L    Glucose 140 (H) 74 - 99 mg/dL    BUN 27 (H) 8 - 23 mg/dL    CREATININE 1.5 (H) 0.7 - 1.2 mg/dL    GFR Non-African American 43 >=60 mL/min/1.73    GFR African American 52     Calcium 9.0 8.6 - 10.2 mg/dL    Total Protein 6.5 6.4 - 8.3 g/dL    Alb 3.3 (L) 3.5 - 5.2 g/dL    Total Bilirubin 1.0 0.0 - 1.2 mg/dL    Alkaline Phosphatase 72 40 - 129 U/L    ALT 14 0 - 40 U/L    AST 30 0 - 39 U/L   Troponin   Result Value Ref Range    Troponin 0.16 (H) 0.00 - 0.03 ng/mL   Brain Natriuretic Peptide   Result Value Ref Range    Pro-BNP 7,328 (H) 0 - 450 pg/mL   EKG 12 Lead   Result Value Ref Range    Ventricular Rate 71 BPM    Atrial Rate 71 BPM    P-R Interval 194 ms    QRS Duration 136 ms    Q-T Interval 446 ms    QTc Calculation (Bazett) 484 ms    P Axis 21 degrees    R Axis -58 degrees    T Axis 119 degrees   EKG 12 Lead   Result Value Ref Range    Ventricular Rate 83 BPM    Atrial Rate 83 BPM    P-R Interval 200 ms    QRS Duration 140 ms    Q-T Interval 426 ms    QTc Calculation (Bazett) 500 ms    P Axis 23 degrees    R Axis -67 degrees    T Axis 99 degrees       RADIOLOGY:  XR CHEST PORTABLE   Final Result      Findings are most compatible with fluid overload or congestive failure   mechanism with small pleural effusions. Clinical correlation is   needed. CT Head WO Contrast   Final Result   There is no evidence of intracranial hemorrhage or mass effect, and no   calvarial traumatic findings are noted. 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 Aston Gurmeet ------------------------- NURSING NOTES AND VITALS REVIEWED ---------------------------  Date / Time Roomed:  7/10/2020  1:03 PM  ED Bed Assignment:  [id number]    The nursing notes within the ED encounter and vital signs as below have been reviewed.      Patient Vitals for the past 24 hrs:   BP Temp Temp src Pulse Resp SpO2 Height Weight   07/10/20 1714 129/79 98.1 °F (36.7 °C) Oral 86 16 94 % 5' 7\" (1.702 m) --   07/10/20 1628 110/60 97.4 °F (36.3 °C) Oral 80 14 97 % -- --   07/10/20 1256 (!) 100/55 97.6 °F (36.4 °C) Temporal 70 14 97 % 5' 7\" (1.702 m) 145

## 2020-07-10 NOTE — CONSULTS
81 MG EC tablet Take 81 mg by mouth daily       metoprolol succinate (TOPROL XL) 100 MG extended release tablet Take 1 tablet by mouth daily 30 tablet 3    finasteride (PROSCAR) 5 MG tablet Take 5 mg by mouth nightly       Multiple Vitamin (MULTIVITAMIN PO) Take 1 tablet by mouth daily       atorvastatin (LIPITOR) 40 MG tablet Take 1 tablet by mouth daily. 30 tablet 3    potassium chloride SA (K-DUR;KLOR-CON) 20 MEQ tablet Take 1 tablet by mouth daily (with breakfast). 60 tablet 3       Physical Exam:  BP (!) 100/55   Pulse 70   Temp 97.6 °F (36.4 °C) (Temporal)   Resp 14   Ht 5' 7\" (1.702 m)   Wt 145 lb (65.8 kg)   SpO2 97%   BMI 22.71 kg/m²   Wt Readings from Last 3 Encounters:   07/10/20 145 lb (65.8 kg)   12/04/19 147 lb (66.7 kg)   05/10/19 156 lb 9.6 oz (71 kg)     Appearance: Awake, alert, no acute respiratory distress  Skin: Intact, no rash  Head: Normocephalic, atraumatic  Eyes: EOMI, no conjunctival erythema  Neck: elevated JVP  Lungs: left sided crackles.   Cardiac: Regular rate and rhythm, +S1S2, no murmurs apparent  Abdomen: Soft, nontender, +bowel sounds  Extremities: lower extremity edema  Neurologic: No focal motor deficits apparent, normal mood and affect  Peripheral Pulses: Intact posterior tibial pulses bilaterally    Laboratory Tests:  Lab Results   Component Value Date    CREATININE 1.5 (H) 07/10/2020    BUN 27 (H) 07/10/2020     07/10/2020    K 4.7 07/10/2020     07/10/2020    CO2 24 07/10/2020     Lab Results   Component Value Date    MG 2.2 07/13/2018     Lab Results   Component Value Date    WBC 6.0 07/10/2020    HGB 12.6 07/10/2020    HCT 38.9 07/10/2020    MCV 91.5 07/10/2020     07/10/2020     Lab Results   Component Value Date    ALT 14 07/10/2020    AST 30 07/10/2020    ALKPHOS 72 07/10/2020    BILITOT 1.0 07/10/2020     Lab Results   Component Value Date    CKTOTAL 102 12/03/2017    CKMB 5.3 12/03/2017    TROPONINI 0.16 (H) 07/10/2020    TROPONINI 0.05 (H) 07/12/2018    TROPONINI 0.07 (H) 12/03/2017     Lab Results   Component Value Date    INR 1.0 07/12/2018    INR 1.1 12/03/2017    INR 1.0 10/07/2012    PROTIME 11.5 07/12/2018    PROTIME 11.4 12/03/2017    PROTIME 11.3 10/07/2012     Lab Results   Component Value Date    TSH 0.487 10/08/2012     No results found for: LABA1C  No results found for: EAG  Lab Results   Component Value Date    CHOL 151 10/08/2012     Lab Results   Component Value Date    TRIG 59 10/08/2012     Lab Results   Component Value Date    HDL 62.8 10/08/2012     Lab Results   Component Value Date    LDLCALC 76 10/08/2012     No results found for: LABVLDL, VLDL  No results found for: CHOLHDLRATIO  Recent Labs     07/10/20  1336   PROBNP 7,328*       Cardiac Tests:  ECG: NSR. RBBB. Telemetry findings reviewed: NSR    Chest X-ray: Lung vascular congestion. Echocardiogram 2017: EF 25-30%. Normal RV. No significant valve abnormalities. ASSESSMENT / PLAN:  77-year-old male with past medical history of medically managed coronary artery disease, ischemic cardiomyopathy presents with congestive heart failure. Patient also has elevated troponin with no ischemic ECG changes. Recommendations  After discussing goals of treatment with the patient and son, he clearly states that he does not want any invasive procedures and his CODE STATUS is DNR. We will start diuresis. We will continue aspirin and atorvastatin. We will cycle troponin. We will refrain from adding another antiplatelet or anticoagulation at this time. Thank you for allowing me to participate in your patient's care. Please feel free to contact me if you have any questions or concerns.     Renetta Humphrey MD  [facility]) Cardiology

## 2020-07-11 LAB
ALBUMIN SERPL-MCNC: 4 G/DL (ref 3.5–5.2)
ALP BLD-CCNC: 90 U/L (ref 40–129)
ALT SERPL-CCNC: 17 U/L (ref 0–40)
ANION GAP SERPL CALCULATED.3IONS-SCNC: 14 MMOL/L (ref 7–16)
AST SERPL-CCNC: 25 U/L (ref 0–39)
BILIRUB SERPL-MCNC: 1.2 MG/DL (ref 0–1.2)
BUN BLDV-MCNC: 27 MG/DL (ref 8–23)
CALCIUM SERPL-MCNC: 9.6 MG/DL (ref 8.6–10.2)
CHLORIDE BLD-SCNC: 101 MMOL/L (ref 98–107)
CO2: 26 MMOL/L (ref 22–29)
CREAT SERPL-MCNC: 1.6 MG/DL (ref 0.7–1.2)
GFR AFRICAN AMERICAN: 49
GFR NON-AFRICAN AMERICAN: 40 ML/MIN/1.73
GLUCOSE BLD-MCNC: 113 MG/DL (ref 74–99)
HCT VFR BLD CALC: 45.6 % (ref 37–54)
HEMOGLOBIN: 15 G/DL (ref 12.5–16.5)
MCH RBC QN AUTO: 29.8 PG (ref 26–35)
MCHC RBC AUTO-ENTMCNC: 32.9 % (ref 32–34.5)
MCV RBC AUTO: 90.7 FL (ref 80–99.9)
PDW BLD-RTO: 13.6 FL (ref 11.5–15)
PLATELET # BLD: 261 E9/L (ref 130–450)
PMV BLD AUTO: 10.9 FL (ref 7–12)
POTASSIUM SERPL-SCNC: 4 MMOL/L (ref 3.5–5)
RBC # BLD: 5.03 E12/L (ref 3.8–5.8)
SODIUM BLD-SCNC: 141 MMOL/L (ref 132–146)
TOTAL PROTEIN: 7.6 G/DL (ref 6.4–8.3)
TROPONIN: 0.16 NG/ML (ref 0–0.03)
WBC # BLD: 6.8 E9/L (ref 4.5–11.5)

## 2020-07-11 PROCEDURE — 6370000000 HC RX 637 (ALT 250 FOR IP): Performed by: GENERAL PRACTICE

## 2020-07-11 PROCEDURE — 96376 TX/PRO/DX INJ SAME DRUG ADON: CPT

## 2020-07-11 PROCEDURE — 84484 ASSAY OF TROPONIN QUANT: CPT

## 2020-07-11 PROCEDURE — 36415 COLL VENOUS BLD VENIPUNCTURE: CPT

## 2020-07-11 PROCEDURE — 80053 COMPREHEN METABOLIC PANEL: CPT

## 2020-07-11 PROCEDURE — 6360000002 HC RX W HCPCS: Performed by: GENERAL PRACTICE

## 2020-07-11 PROCEDURE — G0378 HOSPITAL OBSERVATION PER HR: HCPCS

## 2020-07-11 PROCEDURE — 85027 COMPLETE CBC AUTOMATED: CPT

## 2020-07-11 PROCEDURE — 96372 THER/PROPH/DIAG INJ SC/IM: CPT

## 2020-07-11 RX ORDER — FUROSEMIDE 40 MG/1
40 TABLET ORAL DAILY
Status: DISCONTINUED | OUTPATIENT
Start: 2020-07-12 | End: 2020-07-12 | Stop reason: HOSPADM

## 2020-07-11 RX ADMIN — POTASSIUM CHLORIDE 20 MEQ: 20 TABLET, EXTENDED RELEASE ORAL at 08:53

## 2020-07-11 RX ADMIN — METOPROLOL SUCCINATE 100 MG: 100 TABLET, EXTENDED RELEASE ORAL at 08:53

## 2020-07-11 RX ADMIN — FINASTERIDE 5 MG: 5 TABLET, FILM COATED ORAL at 08:55

## 2020-07-11 RX ADMIN — MULTIVITAMIN TABLET 1 TABLET: TABLET at 08:53

## 2020-07-11 RX ADMIN — LISINOPRIL 10 MG: 10 TABLET ORAL at 08:53

## 2020-07-11 RX ADMIN — ASPIRIN 81 MG: 81 TABLET, COATED ORAL at 08:54

## 2020-07-11 RX ADMIN — ENOXAPARIN SODIUM 30 MG: 30 INJECTION SUBCUTANEOUS at 21:47

## 2020-07-11 RX ADMIN — ISOSORBIDE MONONITRATE 30 MG: 30 TABLET, EXTENDED RELEASE ORAL at 08:53

## 2020-07-11 RX ADMIN — FUROSEMIDE 20 MG: 10 INJECTION, SOLUTION INTRAMUSCULAR; INTRAVENOUS at 08:55

## 2020-07-11 RX ADMIN — ATORVASTATIN CALCIUM 40 MG: 40 TABLET, FILM COATED ORAL at 08:54

## 2020-07-11 ASSESSMENT — PAIN SCALES - GENERAL
PAINLEVEL_OUTOF10: 0

## 2020-07-11 NOTE — PROGRESS NOTES
INPATIENT CARDIOLOGY FOLLOW-UP    Name: Joe Bryson    Age: 80 y.o. Date of Admission: 7/10/2020  1:03 PM    Date of Service: 7/11/2020    Chief Complaint: Follow-up for coronary atherosclerosis, ischemic cardiomyopathy, acute superimposed upon chronic systolic heart failure, abnormal troponin, chronic kidney disease    Interim History: The patient relates an improvement of his respiratory status with incomplete maintenance of intake and output based on incontinence. Persistent clinical volume overload is noted following present diuretic administration and renal function and electrolytes as well as his hemodynamic status remained stable. Review of Systems: The remainder of a complete multisystem review including consitutional, central nervous, respiratory, circulatory, gastrointestinal, genitourinary, endocrinologic, hematologic, musculoskeletal and psychiatric are negative.     Problem List:  Patient Active Problem List   Diagnosis    Pneumonia due to organism    [facility]estive heart failure) ([facility] [address]    Hypoxia    HTN (hypertension)    Valvular heart disease    Chronic systolic [facility]congestive [facility]) ([facility])    LAFB (left anterior fascicular block)    Dyspnea    Short of breath on exertion    Shortness of breath    Acute kidney injury (nontraumatic) ([facility])    Chronic combined gastric and duodenal ulcer    Coronary artery disease involving native coronary artery of native heart without angina pectoris    Congestive heart failure with cardiomyopathy ([facility] [address]       Allergies:  No Known Allergies    Current Medications:  Current Facility-Administered Medications   Medication Dose Route Frequency Provider Last Rate Last Dose    isosorbide mononitrate (IMDUR) extended release tablet 30 mg  30 mg Oral Daily Mickey José Luis, DO   30 mg at 07/11/20 0853    lisinopril (PRINIVIL;ZESTRIL) tablet 10 mg  10 mg Oral Daily Mickey José Luis, DO   10 mg at 07/11/20 0853    metoprolol succinate (TOPROL XL) extended release tablet 100 mg  100 mg Oral Daily Litaie Bees, DO   100 mg at 07/11/20 7741    nitroGLYCERIN (NITROSTAT) SL tablet 0.4 mg  0.4 mg Sublingual Q5 Min PRN Janine Bees, DO        potassium chloride (KLOR-CON M) extended release tablet 20 mEq  20 mEq Oral Daily with breakfast Janine Honeycutt, DO   20 mEq at 07/11/20 5424    finasteride (PROSCAR) tablet 5 mg  5 mg Oral Daily Janine Bees, DO   5 mg at 07/11/20 0855    atorvastatin (LIPITOR) tablet 40 mg  40 mg Oral Daily Johnsie Bees, DO   40 mg at 07/11/20 0854    aspirin EC tablet 81 mg  81 mg Oral Daily Johnsgaudencio Bees, DO   81 mg at 07/11/20 0854    multivitamin 1 tablet  1 tablet Oral Daily Janine Bees, DO   1 tablet at 07/11/20 0853    furosemide (LASIX) injection 20 mg  20 mg Intravenous Daily Janine Bees, DO   20 mg at 07/11/20 0855    enoxaparin (LOVENOX) injection 30 mg  30 mg Subcutaneous Daily Janine Bees, DO   30 mg at 07/10/20 2040         Physical Exam:  /63   Pulse 76   Temp 98.2 °F (36.8 °C) (Oral)   Resp 14   Ht 5' 7\" (1.702 m)   Wt 145 lb (65.8 kg)   SpO2 99%   BMI 22.71 kg/m²   Weight change: Wt Readings from Last 3 Encounters:   07/10/20 145 lb (65.8 kg)   12/04/19 147 lb (66.7 kg)   05/10/19 156 lb 9.6 oz (71 kg)     The patient is awake, alert and in no discomfort or distress. No gross musculoskeletal deformity is present. No significant skin or nail changes are present. Gross examination of head, eyes, nose and throat are negative. Jugular venous pressure is normal and no carotid bruits are present. Normal respiratory effort is noted with no accessory muscle usage present. Lung fields are clear to ascultation. Cardiac examination is notable for a regular rate and rhythm with no palpable thrill. No gallop rhythm or cardiac murmur are identified. A benign abdominal examination is present with no masses or organomegaly.  Intact pulses are present throughout all extremities and moderate pretibial edema with chronic stasis changes is present. No focal neurologic deficits are present. Intake/Output:    Intake/Output Summary (Last 24 hours) at 7/11/2020 1224  Last data filed at 7/11/2020 1024  Gross per 24 hour   Intake 240 ml   Output --   Net 240 ml     I/O this shift:  In: 240 [P.O.:240]  Out: -     Laboratory Tests:  Lab Results   Component Value Date    CREATININE 1.6 (H) 07/11/2020    BUN 27 (H) 07/11/2020     07/11/2020    K 4.0 07/11/2020     07/11/2020    CO2 26 07/11/2020     No results for input(s): CKTOTAL, CKMB in the last 72 hours. Invalid input(s): TROPONONI  Lab Results   Component Value Date     (H) 10/11/2012     Lab Results   Component Value Date    WBC 6.8 07/11/2020    RBC 5.03 07/11/2020    HGB 15.0 07/11/2020    HCT 45.6 07/11/2020    MCV 90.7 07/11/2020    MCH 29.8 07/11/2020    MCHC 32.9 07/11/2020    RDW 13.6 07/11/2020     07/11/2020    MPV 10.9 07/11/2020     Recent Labs     07/10/20  1336 07/11/20  0701   ALKPHOS 72 90   ALT 14 17   AST 30 25   PROT 6.5 7.6   BILITOT 1.0 1.2   LABALBU 3.3* 4.0     Lab Results   Component Value Date    MG 2.2 07/13/2018     Lab Results   Component Value Date    PROTIME 11.5 07/12/2018    INR 1.0 07/12/2018     Lab Results   Component Value Date    TSH 0.487 10/08/2012     No components found for: CHLPL  Lab Results   Component Value Date    TRIG 59 10/08/2012     Lab Results   Component Value Date    HDL 62.8 10/08/2012     Lab Results   Component Value Date    LDLCALC 76 10/08/2012       Cardiac Tests:  Telemetry findings reviewed: sinus rhythm with transient episodes of paroxysmal supraventricular tachycardia, no new tachy/bradyarrhythmias overnight      ASSESSMENT / PLAN: On a clinical basis, the patient is symptomatically improved following diuretic administration with incomplete intake and output limited by his incontinence.   Presently his intravenous dosage of diuretics will be maintained over the next 24 hours prior to conversion to oral administration and attempt to further stabilize his volume status in the face of persistent lower extremity edema and present stable renal function and electrolytes. His abnormal troponin levels likely represent that of increased myocardial oxygen demands in the face of his decompensated heart failure previous documented severe left ventricular systolic dysfunction. Based on his advanced age beyond that of medical management coinciding with his wishes, no additional cardiovascular assessment is presently anticipated. Continued appropriate risk factor modification of blood pressure and serum lipids will further assist in stabilization of his existing coronary atherosclerosis. Note: This report was completed utilizing computer voice recognition software. Every effort has been made to ensure accuracy, however; inadvertent computerized transcription errors may be present. Stephanie Bhatt.  [facility], [facility]

## 2020-07-12 VITALS
BODY MASS INDEX: 22.76 KG/M2 | RESPIRATION RATE: 16 BRPM | HEART RATE: 77 BPM | TEMPERATURE: 98 F | DIASTOLIC BLOOD PRESSURE: 56 MMHG | HEIGHT: 67 IN | OXYGEN SATURATION: 95 % | SYSTOLIC BLOOD PRESSURE: 126 MMHG | WEIGHT: 145 LBS

## 2020-07-12 LAB
ANION GAP SERPL CALCULATED.3IONS-SCNC: 15 MMOL/L (ref 7–16)
BUN BLDV-MCNC: 28 MG/DL (ref 8–23)
CALCIUM SERPL-MCNC: 9.1 MG/DL (ref 8.6–10.2)
CHLORIDE BLD-SCNC: 102 MMOL/L (ref 98–107)
CO2: 24 MMOL/L (ref 22–29)
CREAT SERPL-MCNC: 1.4 MG/DL (ref 0.7–1.2)
GFR AFRICAN AMERICAN: 57
GFR NON-AFRICAN AMERICAN: 47 ML/MIN/1.73
GLUCOSE BLD-MCNC: 122 MG/DL (ref 74–99)
POTASSIUM SERPL-SCNC: 4.2 MMOL/L (ref 3.5–5)
REASON FOR REJECTION: NORMAL
REJECTED TEST: NORMAL
SODIUM BLD-SCNC: 141 MMOL/L (ref 132–146)

## 2020-07-12 PROCEDURE — G0378 HOSPITAL OBSERVATION PER HR: HCPCS

## 2020-07-12 PROCEDURE — 36415 COLL VENOUS BLD VENIPUNCTURE: CPT

## 2020-07-12 PROCEDURE — 97161 PT EVAL LOW COMPLEX 20 MIN: CPT

## 2020-07-12 PROCEDURE — 80048 BASIC METABOLIC PNL TOTAL CA: CPT

## 2020-07-12 PROCEDURE — 6370000000 HC RX 637 (ALT 250 FOR IP): Performed by: INTERNAL MEDICINE

## 2020-07-12 PROCEDURE — 6370000000 HC RX 637 (ALT 250 FOR IP): Performed by: GENERAL PRACTICE

## 2020-07-12 RX ADMIN — ATORVASTATIN CALCIUM 40 MG: 40 TABLET, FILM COATED ORAL at 09:22

## 2020-07-12 RX ADMIN — FUROSEMIDE 40 MG: 40 TABLET ORAL at 09:23

## 2020-07-12 RX ADMIN — METOPROLOL SUCCINATE 100 MG: 100 TABLET, EXTENDED RELEASE ORAL at 09:22

## 2020-07-12 RX ADMIN — LISINOPRIL 10 MG: 10 TABLET ORAL at 09:22

## 2020-07-12 RX ADMIN — FINASTERIDE 5 MG: 5 TABLET, FILM COATED ORAL at 09:22

## 2020-07-12 RX ADMIN — MULTIVITAMIN TABLET 1 TABLET: TABLET at 09:22

## 2020-07-12 RX ADMIN — ASPIRIN 81 MG: 81 TABLET, COATED ORAL at 09:22

## 2020-07-12 RX ADMIN — POTASSIUM CHLORIDE 20 MEQ: 20 TABLET, EXTENDED RELEASE ORAL at 09:22

## 2020-07-12 RX ADMIN — ISOSORBIDE MONONITRATE 30 MG: 30 TABLET, EXTENDED RELEASE ORAL at 09:26

## 2020-07-12 ASSESSMENT — PAIN SCALES - GENERAL: PAINLEVEL_OUTOF10: 0

## 2020-07-12 NOTE — PROGRESS NOTES
[facility] Quality Flow/Interdisciplinary Rounds Progress Note        Quality Flow Rounds held on July 11,2020    Disciplines Attending:  Bedside Nurse, ,  and Nursing Unit Leadership    Jonathan Mcintosh was admitted on 7/10/2020  1:03 PM    Anticipated Discharge Date:  Expected Discharge Date: 07/13/20    Disposition:    José Miguel Score:  José Miguel Scale Score: 20    Readmission Risk              Risk of Unplanned Readmission:        0           Discussed patient goal for the day, patient clinical progression, and barriers to discharge.   The following Goal(s) of the Day/Commitment(s) have been identified:  Await cardiology plan      Osmel Martin  July 11, 2020

## 2020-07-12 NOTE — PROGRESS NOTES
[facility] Quality Flow/Interdisciplinary Rounds Progress Note        Quality Flow Rounds held on July 12, 2020    Disciplines Attending:  Bedside Nurse, ,  and Nursing Unit Leadership    Celestine Garcia was admitted on 7/10/2020  1:03 PM    Anticipated Discharge Date:  Expected Discharge Date: 07/13/20    Disposition:    José Miguel Score:  José Miguel Scale Score: 20    Readmission Risk              Risk of Unplanned Readmission:        0           Discussed patient goal for the day, patient clinical progression, and barriers to discharge.   The following Goal(s) of the Day/Commitment(s) have been identified:  Check cardiology plan      Cachorro Milan  July 12, 2020

## 2020-07-12 NOTE — PROGRESS NOTES
Physical Therapy  Initial Eval    Attending Provider:  Eulalia Carlton DO    Evaluating PT:  Pedro Pablo Patel PT    Room #:  8743/5031-R  Diagnosis:  Acute on Chronic CHF, LE edema  Procedure/Surgery:  NA  Precautions:  Falls  Equipment Needs: Amb without AD Indep    SUBJECTIVE:    Pt lives with alone in a 2 story home with 1 stairs and 0 rail to enter. Lives first floor. Pt ambulated with no AD PTA. OBJECTIVE:   Initial Evaluation  Date: 7/12/20 Treatment Short Term/ Long Term   Goals   Was pt agreeable to Eval/treatment? Yes     Does pt have pain? No     Bed Mobility  Rolling: Indep  Supine to sit: Indep  Sit to supine: Indep  Scooting:      Transfers Sit to stand: Indep  Stand to sit: Indep  Stand pivot:      Ambulation   30 feet with no AD Indep   Amb with S/Indep up to 200+'   Stair negotiation: ascended and descended  NA   2-3 steps indep/S   AM-PAC 6 Clicks 74/21. Pt is A & O x 3  BLE ROM is WFL. BLE strength is grossly WNL. Sensation:  Pt denies numbness and tingling to extremities  Edema:  LE's   Balance: sitting is Indep and standing with no AD is Indep  Endurance: Good age related, no dyspnea following 30' walk     Therapeutic Exercises:    Eval only    Patient response to education:   Pt verbalized understanding Pt demonstrated skill Pt requires further education in this area   Yes yes No     ASSESSMENT:    Comments:  Sitting on couch on arrival in no distress. No conversational dyspnea. Indep with transfers, bed mobility and general mobility without use AD. Gait pattern smaller step/strides for increased double support time to improve standing balances. No drifting, lateral veering or instability with mobility. Treatment:  Patient practiced and was instructed in the following treatment:     Eval only  Chair/bed alarm: NA    Pt's/ family goals   1. To return home    Patient and or family understand(s) diagnosis, prognosis, and plan of care.     PLAN:    PT care will be provided in accordance with the objectives noted above. Exercises and functional mobility practice will be used as well as appropriate assistive devices or modalities to obtain goals. Patient and family education will also be administered as needed. Frequency of treatments: 2-5x/week x 1-2 weeks    Total Treatment Time  20 minutes     Evaluation Time includes thorough review of current medical information, gathering information on past medical history/social history and prior level of function, completion of standardized testing/informal observation of tasks, assessment of data and education on plan of care and goals.     CPT codes:  [x] Low Complexity PT evaluation 50827  [] Moderate Complexity PT evaluation 89345  [] High Complexity PT evaluation 10478  [] PT Re-evaluation 26968  [] Gait training 36721 ** minutes  [] Manual therapy 87990 ** minutes  [] Therapeutic activities 14742 ** minutes  [] Therapeutic exercises 98891 ** minutes  [] Neuromuscular reeducation 64988 ** minutes    Jamilah Leon PT

## 2020-07-12 NOTE — PROGRESS NOTES
lisinopril (PRINIVIL;ZESTRIL) tablet 10 mg  10 mg Oral Daily Johnsie Bees, DO   10 mg at 07/12/20 1599    metoprolol succinate (TOPROL XL) extended release tablet 100 mg  100 mg Oral Daily Johnsie Bees, DO   100 mg at 07/12/20 2065    nitroGLYCERIN (NITROSTAT) SL tablet 0.4 mg  0.4 mg Sublingual Q5 Min PRN Johnsie Bees, DO        potassium chloride (KLOR-CON M) extended release tablet 20 mEq  20 mEq Oral Daily with breakfast Johnsgaudencio Bees, DO   20 mEq at 07/12/20 8611    finasteride (PROSCAR) tablet 5 mg  5 mg Oral Daily Johnsgaudencio Bees, DO   5 mg at 07/12/20 9511    atorvastatin (LIPITOR) tablet 40 mg  40 mg Oral Daily Johnsie Bees, DO   40 mg at 07/12/20 7536    aspirin EC tablet 81 mg  81 mg Oral Daily Johnsie Bees, DO   81 mg at 07/12/20 0332    multivitamin 1 tablet  1 tablet Oral Daily Johnsie Bees, DO   1 tablet at 07/12/20 6499    enoxaparin (LOVENOX) injection 30 mg  30 mg Subcutaneous Daily Johnsie Bees, DO   30 mg at 07/11/20 2147         Physical Exam:  BP (!) 126/56   Pulse 77   Temp 98 °F (36.7 °C) (Oral)   Resp 16   Ht 5' 7\" (1.702 m)   Wt 145 lb (65.8 kg)   SpO2 95%   BMI 22.71 kg/m²   Weight change: Wt Readings from Last 3 Encounters:   07/10/20 145 lb (65.8 kg)   12/04/19 147 lb (66.7 kg)   05/10/19 156 lb 9.6 oz (71 kg)     The patient is awake, alert and in no discomfort or distress. No gross musculoskeletal deformity is present. No significant skin or nail changes are present. Gross examination of head, eyes, nose and throat are negative. Jugular venous pressure is normal and no carotid bruits are present. Normal respiratory effort is noted with no accessory muscle usage present. Lung fields are clear to ascultation. Cardiac examination is notable for an irregular rhythm with no palpable thrill. No gallop rhythm and a soft apical holosystolic murmur are identified. A benign abdominal examination is present with no masses or organomegaly.  Intact pulses are present throughout all extremities and mild chronic lymphedema and venous stasis is present. No focal neurologic deficits are present. Intake/Output:    Intake/Output Summary (Last 24 hours) at 7/12/2020 1139  Last data filed at 7/12/2020 0544  Gross per 24 hour   Intake 120 ml   Output --   Net 120 ml     No intake/output data recorded. Laboratory Tests:  Lab Results   Component Value Date    CREATININE 1.6 (H) 07/11/2020    BUN 27 (H) 07/11/2020     07/11/2020    K 4.0 07/11/2020     07/11/2020    CO2 26 07/11/2020     No results for input(s): CKTOTAL, CKMB in the last 72 hours.     Invalid input(s): TROPONONI  Lab Results   Component Value Date     (H) 10/11/2012     Lab Results   Component Value Date    WBC 6.8 07/11/2020    RBC 5.03 07/11/2020    HGB 15.0 07/11/2020    HCT 45.6 07/11/2020    MCV 90.7 07/11/2020    MCH 29.8 07/11/2020    MCHC 32.9 07/11/2020    RDW 13.6 07/11/2020     07/11/2020    MPV 10.9 07/11/2020     Recent Labs     07/10/20  1336 07/11/20  0701   ALKPHOS 72 90   ALT 14 17   AST 30 25   PROT 6.5 7.6   BILITOT 1.0 1.2   LABALBU 3.3* 4.0     Lab Results   Component Value Date    MG 2.2 07/13/2018     Lab Results   Component Value Date    PROTIME 11.5 07/12/2018    INR 1.0 07/12/2018     Lab Results   Component Value Date    TSH 0.487 10/08/2012     No components found for: CHLPL  Lab Results   Component Value Date    TRIG 59 10/08/2012     Lab Results   Component Value Date    HDL 62.8 10/08/2012     Lab Results   Component Value Date    LDLCALC 76 10/08/2012       Cardiac Tests:  Telemetry findings reviewed: atrial fibrillation with a mean ventricular response of approximately 70 bpm, no new tachy/bradyarrhythmias overnight      ASSESSMENT / PLAN: On a clinical basis, the patient presently appears stable from a cardiovascular standpoint with continued limited adequacy of intake and output based on incontinence and mild residual lower extremity edema with no present additional symptomatology of decompensated systolic heart failure or volume overload. Reassessment of renal function electrolytes are pending and if this remains stable he appears stable for discharge with needs of careful monitoring of his cardiovascular status to reduce risk of decompensation and readmission. Arrangements have been made for follow-up with his primary cardiologist, Emil Mcneill within 1 week of hospital discharge for clinical reassessment to assist in reducing his risk of readmission. Ongoing risk factor modification of blood pressure and serum lipids will additionally be necessary to reduce risk of future atherosclerotic development. Note: This report was completed utilizing computer voice recognition software. Every effort has been made to ensure accuracy, however; inadvertent computerized transcription errors may be present. Armida Bernstein.  Александр Jameson, [address] [facility]

## 2020-07-12 NOTE — H&P
[address] [address]                              HISTORY AND PHYSICAL    PATIENT NAME: Deepali Judd                     :        1922  MED REC NO:   [id number]                            ROOM:  ACCOUNT NO:   [de-identified]                           ADMIT DATE: 07/10/2020  PROVIDER:     Katy Jett DO    HISTORY OF PRESENT ILLNESS:  This is a 77-year-old white male, has a  longstanding history of coronary artery disease with chronic systolic  heart failure, very low ejection fraction, told his family that he was  unable to breathe very well, especially the first breath was very  shallow and second breath was a little bit better, but just extremely  short of breath and weak and unable to perform any activities without  becoming short of breath. He has had similar episodes in the past, all  consistent with his congestive cardiomyopathy. They really request no  heroic measures, so we are just going to bring him in and gently diurese  him, bring in Cardiology, follow him along. EKG did not show any acute  changes, but troponin was slightly elevated. We will follow these  enzymes along. BUN and creatinine were pretty stable. There is some  chronic kidney disease. BUN 27, creatinine 1.6. He does have lower leg  edema as well. ProBNP was significantly elevated. RECENT CURRENT MEDICATIONS:  Included Nitrostat, potassium replacement,  Lipitor, multivitamin, Proscar, metoprolol, aspirin, isosorbide, Lasix,  and lisinopril. PAST MEDICAL HISTORY:  Significant for valvular heart disease, shortness  of breath, pneumonia, left anterior fascicular block, hypoxia,  hypertension, dyspnea, coronary artery disease, chronic congestive heart  failure, chronic congestive cardiomyopathy, primary systolic heart  failure, combined gastric and duodenal ulcer, acute kidney injury,  multiple skin tears.     PAST SURGICAL HISTORY: Consistent with echocardiograms, colon surgery,  and hernia repair. SOCIAL HISTORY:  The patient is a lifelong nonsmoker, nondrinker. Denies use of narcotic drugs or alcoholic beverages. FAMILY MEDICAL HISTORY:  Noted and discussed. REVIEW OF SYSTEMS:  Negative for vertigo, cephalgia, ringing in the  ears, hearing loss, difficulty swallowing, hoarseness in his voice. He  does not have any chest pain or palpitations. He does have some  orthopnea, paroxysmal nocturnal dyspnea, and edema. He has no cough,  but he is short of breath with exertion. No hemoptysis or pleuritic  pain. There is no nausea, vomiting, diarrhea, constipation, blood in  the stool, or change in weight. No urgency, frequency, dysuria,  hematuria. He does have nocturia, some hesitancy. His skin has  multiple tears from very thin skin and multiple falls, seems to be  intact, warm and dry. Psychiatric system negative for anxiety or  depression. Neurologic system negative for CVA, seizures, tremors,  tics, or TIAs. PHYSICAL EXAMINATION:  GENERAL:  Shows this to be a 80-year-old white male in moderate distress  with the above complaints. HEAD, EYES, EARS, NOSE, AND THROAT:  Examination shows the head to be  normocephalic and atraumatic. The pupils are equal and reactive to  light and accommodation. Extraocular eye muscles intact. Tympanic  membranes are clear. Ear canals are patent. Oral mucosa pink and  moist.  NECK:  Neck veins are flat. Nondistended. No carotid bruits. CHEST:  Symmetrical.  HEART:  Had a regular rate and rhythm. S3 gallop noted. LUNGS:  Showed diminished breath sounds, basilar crackles, poor  respiratory effort. ABDOMEN:  Soft, nontender, nondistended. Bowel sounds are present in  all four quadrants. EXTERNAL GENITALIA:  Intact. EXTREMITIES:  Peripheral pulses are okay. Legs without edema. SPINE:  Shows physiologic curve.     IMPRESSION:  Initial impression at this time is acute-on-chronic  systolic congestive heart failure. Again bring the patient in, cycle  enzymes, check his EKG, bring in Cardiology, gently diurese him. At the  time of admission, his condition is good. His prognosis is guarded.         Molly Soares DO    D: 07/11/2020 13:02:01       T: 07/11/2020 13:12:58     [id number]  Job#: [id number]     Doc#: [id number]    CC:

## 2020-07-13 ENCOUNTER — TELEPHONE (OUTPATIENT)
Dept: CARDIOLOGY CLINIC | Age: 85
End: 2020-07-13

## 2020-07-13 ENCOUNTER — CARE COORDINATION (OUTPATIENT)
Dept: CASE MANAGEMENT | Age: 85
End: 2020-07-13

## 2020-07-13 NOTE — TELEPHONE ENCOUNTER
----- Message from Guille Lockwood MD sent at 7/12/2020 11:44 AM EDT -----  Please schedule follow-up with Jsoelyn Jimenez within the next week following hospitalization with acute superimposed upon chronic systolic heart failure

## 2020-07-13 NOTE — CARE COORDINATION
Patient contacted regarding Suman Kent. Discussed COVID-19 related testing which was not done at this time. Care Transition Nurse/ Ambulatory Care Manager contacted the patient by telephone to perform post discharge assessment. Verified name and  with patient as identifiers. Provided introduction to self, and explanation of the CTN/ACM role, and reason for call due to risk factors for infection and/or exposure to COVID-19. Symptoms reviewed with patient who verbalized NONE of  the following symptoms: fever, fatigue, pain or aching joints, cough, shortness of breath, chills or shaking, sweating, nausea, vomiting, diarrhea, chest pain, no new symptoms and no worsening symptoms. Pt reported his breathing was much better. Pt was able to talk throughout the interview without becoming SOB. Due to no new or worsening symptoms encounter was not routed to provider for escalation. Discussed follow-up appointments. If no appointment was previously scheduled, appointment scheduling offered: pt already scheduled [facility] follow up appointment(s):   Future Appointments   Date Time Provider Artie Millan   2020 10:00 AM STAN Brownlee - CNP [place] [facility] follow up appointment(s):      Patient has following risk factors of: heart failure, chronic kidney disease and HTN. CTN/ACM reviewed discharge instructions, medical action plan and red flags such as increased shortness of breath, increasing fever and signs of decompensation with patient who verbalized understanding. Discussed exposure protocols and quarantine with CDC Guidelines What to do if you are sick with coronavirus disease .  Patient was given an opportunity for questions and concerns. The patient agrees to contact the [facility] exposure line [phone], [facility]: ([phone]) and PCP office for questions related to their healthcare.  CTN/ACM provided contact information for future needs. Reviewed and educated patient on any new and changed medications related to discharge diagnosis  Pt stated he has Nitro \"in there\"    Patient/family/caregiver given information for [employer] and agrees to enroll no     Pt was pleasant. Reported his breathing is much better than when he went to hospital.  Pt reported he made 2 dr appt today. Plan for follow-up call in 5-7 days based on severity of symptoms and risk factors.     [facility] Transition Nurse  [phone]

## 2020-07-20 ENCOUNTER — CARE COORDINATION (OUTPATIENT)
Dept: CASE MANAGEMENT | Age: 85
End: 2020-07-20

## 2020-07-20 NOTE — CARE COORDINATION
[facility] Transitions Follow Up Call    2020    Patient: Joe Bryson  Patient : 1922   MRN: [id number]  Reason for Admission: Acute systolic congestive heart failure  Discharge Date: 20 RARS: No data recorded       Spoke with: No one    Attempt to reach the patient for Covid-19 Monitoring at risk Care Transition call post hospital discharge. No answer. No voicemail. No further outreach scheduled at this time due to inability to contact patient. Episode to auto resolve on 20.         Follow Up  Future Appointments   Date Time Provider Artie Millan   2020 10:00 AM Reyna 1923 JULIAN Smith

## 2020-07-22 ENCOUNTER — OFFICE VISIT (OUTPATIENT)
Dept: CARDIOLOGY CLINIC | Age: 85
End: 2020-07-22
Payer: MEDICARE

## 2020-07-22 VITALS
WEIGHT: 133.2 LBS | SYSTOLIC BLOOD PRESSURE: 116 MMHG | HEART RATE: 62 BPM | RESPIRATION RATE: 18 BRPM | DIASTOLIC BLOOD PRESSURE: 70 MMHG | BODY MASS INDEX: 20.91 KG/M2 | HEIGHT: 67 IN | OXYGEN SATURATION: 97 %

## 2020-07-22 PROCEDURE — 93000 ELECTROCARDIOGRAM COMPLETE: CPT | Performed by: INTERNAL MEDICINE

## 2020-07-22 PROCEDURE — 1036F TOBACCO NON-USER: CPT | Performed by: NURSE PRACTITIONER

## 2020-07-22 PROCEDURE — 4040F PNEUMOC VAC/ADMIN/RCVD: CPT | Performed by: NURSE PRACTITIONER

## 2020-07-22 PROCEDURE — 1123F ACP DISCUSS/DSCN MKR DOCD: CPT | Performed by: NURSE PRACTITIONER

## 2020-07-22 PROCEDURE — G8427 DOCREV CUR MEDS BY ELIG CLIN: HCPCS | Performed by: NURSE PRACTITIONER

## 2020-07-22 PROCEDURE — G8420 CALC BMI NORM PARAMETERS: HCPCS | Performed by: NURSE PRACTITIONER

## 2020-07-22 PROCEDURE — 99213 OFFICE O/P EST LOW 20 MIN: CPT | Performed by: NURSE PRACTITIONER

## 2020-07-22 RX ORDER — DIGOXIN 125 MCG
TABLET ORAL
COMMUNITY

## 2020-07-22 NOTE — PROGRESS NOTES
Shortness of breath 12/03/2017    LAFB (left anterior fascicular block) 02/17/2015    HTN (hypertension) 08/05/2013    Valvular heart disease 08/05/2013    Chronic systolic CHF (congestive heart failure) ([facility] 08/05/2013    Pneumonia due to organism 10/07/2012     Replacing Inactive Diagnoses      CHF (congestive heart failure) ([facility] 10/07/2012    Hypoxia 10/07/2012       Past Medical History:   Diagnosis Date    Cancer ([facility]     colon    CHF (congestive heart failure) ([facility]     Hyperlipidemia     Hypertension     Kidney failure        Past Surgical History:   Procedure Laterality Date    COLON SURGERY      ECHOCARDIOGRAM COMPLETE 2D W DOPPLER W COLOR  10/8/2012         HERNIA REPAIR           No Known Allergies      Outpatient Medications Marked as Taking for the 7/22/20 encounter (Office Visit) with STAN Mcknight CNP   Medication Sig Dispense Refill    digoxin (LANOXIN) 125 MCG tablet Takes 1/2 tab daily      nitroGLYCERIN (NITROSTAT) 0.4 MG SL tablet up to max of 3 total doses. If no relief after 1 dose, call 911. (Patient taking differently: up to max of 3 total doses. If no relief after 1 dose, call 911. Has never used) 25 tablet 3    lisinopril (PRINIVIL;ZESTRIL) 10 MG tablet Take 1 tablet by mouth daily 30 tablet 3    furosemide (LASIX) 20 MG tablet Take 1 tablet by mouth daily 60 tablet 3    isosorbide mononitrate (IMDUR) 30 MG extended release tablet Take 30 mg by mouth daily      metoprolol succinate (TOPROL XL) 100 MG extended release tablet Take 1 tablet by mouth daily 30 tablet 3    finasteride (PROSCAR) 5 MG tablet Take 5 mg by mouth daily       Multiple Vitamin (MULTIVITAMIN PO) Take 1 tablet by mouth daily       atorvastatin (LIPITOR) 40 MG tablet Take 1 tablet by mouth daily. 30 tablet 3    potassium chloride SA (K-DUR;KLOR-CON) 20 MEQ tablet Take 1 tablet by mouth daily (with breakfast).  60 tablet 3         Review of Systems:   Cardiac: As per HPI  General: No perfused. All the following diagnostics were personally reviewed and interpreted by me. LAB DATA:     7/11/2020 07:01 7/12/2020 12:22   Sodium 141 141   Potassium 4.0 4.2   Chloride 101 102   CO2 26 24   BUN 27 (H) 28 (H)   Creatinine 1.6 (H) 1.4 (H)   Anion Gap 14 15   GFR Non- 40 47   GFR African American 49 57   Glucose 113 (H) 122 (H)   Calcium 9.6 9.1   Total Protein 7.6    Albumin 4.0    Alk Phos 90    ALT 17    AST 25    Bilirubin 1.2    WBC 6.8    RBC 5.03    Hemoglobin Quant 15.0    Hematocrit 45.6    MCV 90.7    MCH 29.8    MCHC 32.9    MPV 10.9    RDW 13.6    Platelet Count 147        IMAGING:    CXR (7/10/2020)  Impression:    Findings are most compatible with fluid overload or congestive failure   mechanism with small pleural effusions.          CARDIAC TESTING:    TTE (12/4/2017, Dr. Kiki Hendrix)   Summary:   Severely reduced left ventricular systolic function. Ejection fraction is visually estimated at 25-30%. Normal right ventricular size and function (TAPSE 2.4 cm). There is doppler evidence of stage II diastolic dysfunction. Mild mitral regurgitation. Mild-moderate aortic regurgitation. Mild tricuspid regurgitation. PASP is estimated at 46 mmHg. Mild pulmonic regurgitation. Left-sided pleural effusion. EKG  Sinus Rhythm   RBBB / LAFB      ASSESSMENT:  1. Chronic HFrEF  2. ACC stage C / NYHA class III  3. Near euvolemic with some dependent lower extremity edema. 4. Cardiomyopathy presumed ischemic. Medical therapy only per patient with no ischemia evaluation on file. 5. LVEF 25-30%, LVEDD 5.1, LVMI 148 (per TTE 12/2017)  6. Mild valvular disease   7. HTN  8. HLD - on statin therapy  9. CKD  10. DNR code status      PLAN:  1. Continue current cardiac mediations. 2. Elevate your legs as much as possible    3.  Weigh yourself daily    -Stay Hydrated    -Diet should sodium restricted to 2 grams    -Again watch your daily weight trends and if you gain water weight please follow below instructions.    -If you gain 3-5 pounds in 2-3 days OR notice that you are retaining fluid in anyway just like you did before then take an extra dose of your water pill (furosemide/Lasix) every day until you lose the weight or feel better.    -If you notice that you have taken more than 3 extra doses in 1 week then please call and let us know. -If at any time you feel that you are retaining fluid, your medications are not working, or you feel ill in anyway, then please call us for either same day appointment or the next day, and for instructions. Our goal is to keep you out of the emergency room and the hospital and we have ways to do it. You just need to call us in a timely manner.     -If you become sick for other reasons, and notice that you are not urinating as much, the urine is very dark, you have significant diarrhea or vomiting, then please DO NOT take your water pill and CALL US immediately. 4. Return visit with Dr. Lili Gresham in 3 months.        Andreina Postal APRN-CNP  [facility]

## 2023-01-01 ENCOUNTER — APPOINTMENT (OUTPATIENT)
Dept: GENERAL RADIOLOGY | Age: 88
DRG: 177 | End: 2023-01-01
Payer: MEDICARE

## 2023-01-01 ENCOUNTER — HOSPITAL ENCOUNTER (INPATIENT)
Age: 88
LOS: 3 days | DRG: 177 | End: 2023-01-15
Attending: STUDENT IN AN ORGANIZED HEALTH CARE EDUCATION/TRAINING PROGRAM | Admitting: GENERAL PRACTICE
Payer: MEDICARE

## 2023-01-01 VITALS
TEMPERATURE: 97.5 F | HEIGHT: 67 IN | SYSTOLIC BLOOD PRESSURE: 93 MMHG | HEART RATE: 108 BPM | BODY MASS INDEX: 21.58 KG/M2 | OXYGEN SATURATION: 95 % | RESPIRATION RATE: 24 BRPM | WEIGHT: 137.5 LBS | DIASTOLIC BLOOD PRESSURE: 80 MMHG

## 2023-01-01 DIAGNOSIS — N17.9 AKI (ACUTE KIDNEY INJURY) (HCC): ICD-10-CM

## 2023-01-01 DIAGNOSIS — J18.9 PNEUMONIA DUE TO INFECTIOUS ORGANISM, UNSPECIFIED LATERALITY, UNSPECIFIED PART OF LUNG: Primary | ICD-10-CM

## 2023-01-01 DIAGNOSIS — R77.8 ELEVATED TROPONIN: ICD-10-CM

## 2023-01-01 DIAGNOSIS — U07.1 COVID-19: ICD-10-CM

## 2023-01-01 LAB
ACANTHOCYTES: ABNORMAL
ALBUMIN SERPL-MCNC: 3.3 G/DL (ref 3.5–5.2)
ALP BLD-CCNC: 72 U/L (ref 40–129)
ALT SERPL-CCNC: 37 U/L (ref 0–40)
ANION GAP SERPL CALCULATED.3IONS-SCNC: 11 MMOL/L (ref 7–16)
ANION GAP SERPL CALCULATED.3IONS-SCNC: 17 MMOL/L (ref 7–16)
ANION GAP SERPL CALCULATED.3IONS-SCNC: 21 MMOL/L (ref 7–16)
AST SERPL-CCNC: 51 U/L (ref 0–39)
ATYPICAL LYMPHOCYTE RELATIVE PERCENT: 0.9 % (ref 0–4)
BACTERIA: ABNORMAL /HPF
BASOPHILS ABSOLUTE: 0 E9/L (ref 0–0.2)
BASOPHILS ABSOLUTE: 0.01 E9/L (ref 0–0.2)
BASOPHILS RELATIVE PERCENT: 0 % (ref 0–2)
BASOPHILS RELATIVE PERCENT: 0.1 % (ref 0–2)
BILIRUB SERPL-MCNC: 1.4 MG/DL (ref 0–1.2)
BILIRUBIN URINE: NEGATIVE
BLOOD, URINE: ABNORMAL
BUN BLDV-MCNC: 59 MG/DL (ref 6–23)
BUN BLDV-MCNC: 62 MG/DL (ref 6–23)
BUN BLDV-MCNC: 72 MG/DL (ref 6–23)
BURR CELLS: ABNORMAL
BURR CELLS: ABNORMAL
C-REACTIVE PROTEIN: 16.2 MG/DL (ref 0–0.4)
C-REACTIVE PROTEIN: 7.4 MG/DL (ref 0–0.4)
CALCIUM SERPL-MCNC: 8.4 MG/DL (ref 8.6–10.2)
CALCIUM SERPL-MCNC: 8.8 MG/DL (ref 8.6–10.2)
CALCIUM SERPL-MCNC: 8.8 MG/DL (ref 8.6–10.2)
CHLORIDE BLD-SCNC: 101 MMOL/L (ref 98–107)
CHLORIDE BLD-SCNC: 106 MMOL/L (ref 98–107)
CHLORIDE BLD-SCNC: 107 MMOL/L (ref 98–107)
CLARITY: CLEAR
CO2: 12 MMOL/L (ref 22–29)
CO2: 20 MMOL/L (ref 22–29)
CO2: 21 MMOL/L (ref 22–29)
COLOR: YELLOW
CREAT SERPL-MCNC: 1.6 MG/DL (ref 0.7–1.2)
CREAT SERPL-MCNC: 1.7 MG/DL (ref 0.7–1.2)
CREAT SERPL-MCNC: 1.7 MG/DL (ref 0.7–1.2)
EKG ATRIAL RATE: 94 BPM
EKG P AXIS: 42 DEGREES
EKG P-R INTERVAL: 198 MS
EKG Q-T INTERVAL: 394 MS
EKG QRS DURATION: 142 MS
EKG QTC CALCULATION (BAZETT): 492 MS
EKG R AXIS: -54 DEGREES
EKG T AXIS: 114 DEGREES
EKG VENTRICULAR RATE: 94 BPM
EOSINOPHILS ABSOLUTE: 0 E9/L (ref 0.05–0.5)
EOSINOPHILS ABSOLUTE: 0 E9/L (ref 0.05–0.5)
EOSINOPHILS RELATIVE PERCENT: 0 % (ref 0–6)
EOSINOPHILS RELATIVE PERCENT: 0 % (ref 0–6)
FERRITIN: 2777 NG/ML
GFR SERPL CREATININE-BSD FRML MDRD: 35 ML/MIN/1.73
GFR SERPL CREATININE-BSD FRML MDRD: 35 ML/MIN/1.73
GFR SERPL CREATININE-BSD FRML MDRD: 38 ML/MIN/1.73
GLUCOSE BLD-MCNC: 112 MG/DL (ref 74–99)
GLUCOSE BLD-MCNC: 117 MG/DL (ref 74–99)
GLUCOSE BLD-MCNC: 198 MG/DL (ref 74–99)
GLUCOSE URINE: NEGATIVE MG/DL
HCT VFR BLD CALC: 42.1 % (ref 37–54)
HCT VFR BLD CALC: 42.4 % (ref 37–54)
HCT VFR BLD CALC: 47.9 % (ref 37–54)
HEMOGLOBIN: 14 G/DL (ref 12.5–16.5)
HEMOGLOBIN: 14.2 G/DL (ref 12.5–16.5)
HEMOGLOBIN: 14.8 G/DL (ref 12.5–16.5)
HYALINE CASTS: ABNORMAL /LPF (ref 0–2)
HYPOCHROMIA: ABNORMAL
IMMATURE GRANULOCYTES #: 0.06 E9/L
IMMATURE GRANULOCYTES %: 0.8 % (ref 0–5)
INFLUENZA A BY PCR: NOT DETECTED
INFLUENZA B BY PCR: NOT DETECTED
KETONES, URINE: NEGATIVE MG/DL
LACTATE DEHYDROGENASE: 443 U/L (ref 135–225)
LEUKOCYTE ESTERASE, URINE: NEGATIVE
LYMPHOCYTES ABSOLUTE: 0.23 E9/L (ref 1.5–4)
LYMPHOCYTES ABSOLUTE: 0.45 E9/L (ref 1.5–4)
LYMPHOCYTES RELATIVE PERCENT: 1.7 % (ref 20–42)
LYMPHOCYTES RELATIVE PERCENT: 6.4 % (ref 20–42)
MCH RBC QN AUTO: 29 PG (ref 26–35)
MCH RBC QN AUTO: 29.4 PG (ref 26–35)
MCH RBC QN AUTO: 29.6 PG (ref 26–35)
MCHC RBC AUTO-ENTMCNC: 30.9 % (ref 32–34.5)
MCHC RBC AUTO-ENTMCNC: 33 % (ref 32–34.5)
MCHC RBC AUTO-ENTMCNC: 33.7 % (ref 32–34.5)
MCV RBC AUTO: 87.9 FL (ref 80–99.9)
MCV RBC AUTO: 88 FL (ref 80–99.9)
MCV RBC AUTO: 95.2 FL (ref 80–99.9)
MONOCYTES ABSOLUTE: 0.23 E9/L (ref 0.1–0.95)
MONOCYTES ABSOLUTE: 0.49 E9/L (ref 0.1–0.95)
MONOCYTES RELATIVE PERCENT: 2.6 % (ref 2–12)
MONOCYTES RELATIVE PERCENT: 6.9 % (ref 2–12)
NEUTROPHILS ABSOLUTE: 6.05 E9/L (ref 1.8–7.3)
NEUTROPHILS ABSOLUTE: 7.41 E9/L (ref 1.8–7.3)
NEUTROPHILS RELATIVE PERCENT: 85.8 % (ref 43–80)
NEUTROPHILS RELATIVE PERCENT: 94.8 % (ref 43–80)
NITRITE, URINE: NEGATIVE
NUCLEATED RED BLOOD CELLS: 0 /100 WBC
PDW BLD-RTO: 13.2 FL (ref 11.5–15)
PDW BLD-RTO: 13.4 FL (ref 11.5–15)
PDW BLD-RTO: 13.7 FL (ref 11.5–15)
PH UA: 6 (ref 5–9)
PLATELET # BLD: 103 E9/L (ref 130–450)
PLATELET # BLD: 126 E9/L (ref 130–450)
PLATELET # BLD: 130 E9/L (ref 130–450)
PMV BLD AUTO: 12.1 FL (ref 7–12)
PMV BLD AUTO: 12.3 FL (ref 7–12)
PMV BLD AUTO: 12.5 FL (ref 7–12)
POIKILOCYTES: ABNORMAL
POIKILOCYTES: ABNORMAL
POLYCHROMASIA: ABNORMAL
POTASSIUM SERPL-SCNC: 4.7 MMOL/L (ref 3.5–5)
POTASSIUM SERPL-SCNC: 4.9 MMOL/L (ref 3.5–5)
POTASSIUM SERPL-SCNC: 5 MMOL/L (ref 3.5–5)
PRO-BNP: ABNORMAL PG/ML (ref 0–450)
PROCALCITONIN: 0.15 NG/ML (ref 0–0.08)
PROCALCITONIN: 0.88 NG/ML (ref 0–0.08)
PROTEIN UA: 30 MG/DL
RBC # BLD: 4.79 E12/L (ref 3.8–5.8)
RBC # BLD: 4.82 E12/L (ref 3.8–5.8)
RBC # BLD: 5.03 E12/L (ref 3.8–5.8)
RBC UA: ABNORMAL /HPF (ref 0–2)
REASON FOR REJECTION: NORMAL
REASON FOR REJECTION: NORMAL
REJECTED TEST: NORMAL
REJECTED TEST: NORMAL
SARS-COV-2, NAAT: DETECTED
SEDIMENTATION RATE, ERYTHROCYTE: 9 MM/HR (ref 0–15)
SODIUM BLD-SCNC: 138 MMOL/L (ref 132–146)
SODIUM BLD-SCNC: 138 MMOL/L (ref 132–146)
SODIUM BLD-SCNC: 140 MMOL/L (ref 132–146)
SPECIFIC GRAVITY UA: 1.01 (ref 1–1.03)
TOTAL PROTEIN: 6.9 G/DL (ref 6.4–8.3)
TROPONIN, HIGH SENSITIVITY: 609 NG/L (ref 0–11)
TROPONIN, HIGH SENSITIVITY: 680 NG/L (ref 0–11)
TROPONIN, HIGH SENSITIVITY: 684 NG/L (ref 0–11)
URINE CULTURE, ROUTINE: NORMAL
UROBILINOGEN, URINE: 0.2 E.U./DL
WBC # BLD: 12.7 E9/L (ref 4.5–11.5)
WBC # BLD: 7.1 E9/L (ref 4.5–11.5)
WBC # BLD: 7.8 E9/L (ref 4.5–11.5)
WBC UA: ABNORMAL /HPF (ref 0–5)

## 2023-01-01 PROCEDURE — 6360000002 HC RX W HCPCS: Performed by: GENERAL PRACTICE

## 2023-01-01 PROCEDURE — 80048 BASIC METABOLIC PNL TOTAL CA: CPT

## 2023-01-01 PROCEDURE — 2060000000 HC ICU INTERMEDIATE R&B

## 2023-01-01 PROCEDURE — 86140 C-REACTIVE PROTEIN: CPT

## 2023-01-01 PROCEDURE — 96365 THER/PROPH/DIAG IV INF INIT: CPT

## 2023-01-01 PROCEDURE — 2580000003 HC RX 258: Performed by: EMERGENCY MEDICINE

## 2023-01-01 PROCEDURE — 96375 TX/PRO/DX INJ NEW DRUG ADDON: CPT

## 2023-01-01 PROCEDURE — 83880 ASSAY OF NATRIURETIC PEPTIDE: CPT

## 2023-01-01 PROCEDURE — 71045 X-RAY EXAM CHEST 1 VIEW: CPT

## 2023-01-01 PROCEDURE — XW0DXM6 INTRODUCTION OF BARICITINIB INTO MOUTH AND PHARYNX, EXTERNAL APPROACH, NEW TECHNOLOGY GROUP 6: ICD-10-PCS | Performed by: INTERNAL MEDICINE

## 2023-01-01 PROCEDURE — 84145 PROCALCITONIN (PCT): CPT

## 2023-01-01 PROCEDURE — 6360000002 HC RX W HCPCS: Performed by: INTERNAL MEDICINE

## 2023-01-01 PROCEDURE — 85651 RBC SED RATE NONAUTOMATED: CPT

## 2023-01-01 PROCEDURE — 84484 ASSAY OF TROPONIN QUANT: CPT

## 2023-01-01 PROCEDURE — 82728 ASSAY OF FERRITIN: CPT

## 2023-01-01 PROCEDURE — 87088 URINE BACTERIA CULTURE: CPT

## 2023-01-01 PROCEDURE — 87502 INFLUENZA DNA AMP PROBE: CPT

## 2023-01-01 PROCEDURE — 81001 URINALYSIS AUTO W/SCOPE: CPT

## 2023-01-01 PROCEDURE — 36415 COLL VENOUS BLD VENIPUNCTURE: CPT

## 2023-01-01 PROCEDURE — 85025 COMPLETE CBC W/AUTO DIFF WBC: CPT

## 2023-01-01 PROCEDURE — 85027 COMPLETE CBC AUTOMATED: CPT

## 2023-01-01 PROCEDURE — 6370000000 HC RX 637 (ALT 250 FOR IP): Performed by: GENERAL PRACTICE

## 2023-01-01 PROCEDURE — 93005 ELECTROCARDIOGRAM TRACING: CPT | Performed by: STUDENT IN AN ORGANIZED HEALTH CARE EDUCATION/TRAINING PROGRAM

## 2023-01-01 PROCEDURE — 6370000000 HC RX 637 (ALT 250 FOR IP): Performed by: INTERNAL MEDICINE

## 2023-01-01 PROCEDURE — 83615 LACTATE (LD) (LDH) ENZYME: CPT

## 2023-01-01 PROCEDURE — 2580000003 HC RX 258: Performed by: STUDENT IN AN ORGANIZED HEALTH CARE EDUCATION/TRAINING PROGRAM

## 2023-01-01 PROCEDURE — 99285 EMERGENCY DEPT VISIT HI MDM: CPT

## 2023-01-01 PROCEDURE — 51702 INSERT TEMP BLADDER CATH: CPT

## 2023-01-01 PROCEDURE — 80053 COMPREHEN METABOLIC PANEL: CPT

## 2023-01-01 PROCEDURE — 87635 SARS-COV-2 COVID-19 AMP PRB: CPT

## 2023-01-01 PROCEDURE — 2580000003 HC RX 258: Performed by: GENERAL PRACTICE

## 2023-01-01 PROCEDURE — 2700000000 HC OXYGEN THERAPY PER DAY

## 2023-01-01 PROCEDURE — 2500000003 HC RX 250 WO HCPCS: Performed by: STUDENT IN AN ORGANIZED HEALTH CARE EDUCATION/TRAINING PROGRAM

## 2023-01-01 PROCEDURE — 6360000002 HC RX W HCPCS: Performed by: STUDENT IN AN ORGANIZED HEALTH CARE EDUCATION/TRAINING PROGRAM

## 2023-01-01 PROCEDURE — 96366 THER/PROPH/DIAG IV INF ADDON: CPT

## 2023-01-01 RX ORDER — BUDESONIDE AND FORMOTEROL FUMARATE DIHYDRATE 160; 4.5 UG/1; UG/1
2 AEROSOL RESPIRATORY (INHALATION) 2 TIMES DAILY
Status: DISCONTINUED | OUTPATIENT
Start: 2023-01-01 | End: 2023-01-01 | Stop reason: HOSPADM

## 2023-01-01 RX ORDER — FUROSEMIDE 10 MG/ML
20 INJECTION INTRAMUSCULAR; INTRAVENOUS ONCE
Status: COMPLETED | OUTPATIENT
Start: 2023-01-01 | End: 2023-01-01

## 2023-01-01 RX ORDER — ISOSORBIDE MONONITRATE 30 MG/1
30 TABLET, EXTENDED RELEASE ORAL DAILY
Status: DISCONTINUED | OUTPATIENT
Start: 2023-01-01 | End: 2023-01-01 | Stop reason: HOSPADM

## 2023-01-01 RX ORDER — ONDANSETRON 2 MG/ML
4 INJECTION INTRAMUSCULAR; INTRAVENOUS EVERY 6 HOURS PRN
Status: DISCONTINUED | OUTPATIENT
Start: 2023-01-01 | End: 2023-01-01 | Stop reason: HOSPADM

## 2023-01-01 RX ORDER — DEXAMETHASONE SODIUM PHOSPHATE 4 MG/ML
4 INJECTION, SOLUTION INTRA-ARTICULAR; INTRALESIONAL; INTRAMUSCULAR; INTRAVENOUS; SOFT TISSUE EVERY 12 HOURS
Status: DISCONTINUED | OUTPATIENT
Start: 2023-01-01 | End: 2023-01-01 | Stop reason: HOSPADM

## 2023-01-01 RX ORDER — METOPROLOL SUCCINATE 100 MG/1
100 TABLET, EXTENDED RELEASE ORAL DAILY
Status: DISCONTINUED | OUTPATIENT
Start: 2023-01-01 | End: 2023-01-01 | Stop reason: HOSPADM

## 2023-01-01 RX ORDER — GUAIFENESIN/DEXTROMETHORPHAN 100-10MG/5
5 SYRUP ORAL EVERY 4 HOURS PRN
Status: DISCONTINUED | OUTPATIENT
Start: 2023-01-01 | End: 2023-01-01 | Stop reason: HOSPADM

## 2023-01-01 RX ORDER — DOXYCYCLINE HYCLATE 100 MG/1
100 CAPSULE ORAL EVERY 12 HOURS SCHEDULED
Status: DISCONTINUED | OUTPATIENT
Start: 2023-01-01 | End: 2023-01-01 | Stop reason: HOSPADM

## 2023-01-01 RX ORDER — ENOXAPARIN SODIUM 100 MG/ML
1 INJECTION SUBCUTANEOUS ONCE
Status: DISCONTINUED | OUTPATIENT
Start: 2023-01-01 | End: 2023-01-01 | Stop reason: HOSPADM

## 2023-01-01 RX ORDER — SODIUM CHLORIDE 9 MG/ML
INJECTION, SOLUTION INTRAVENOUS CONTINUOUS
Status: DISCONTINUED | OUTPATIENT
Start: 2023-01-01 | End: 2023-01-01

## 2023-01-01 RX ORDER — IPRATROPIUM BROMIDE AND ALBUTEROL SULFATE 2.5; .5 MG/3ML; MG/3ML
1 SOLUTION RESPIRATORY (INHALATION) EVERY 4 HOURS PRN
Status: DISCONTINUED | OUTPATIENT
Start: 2023-01-01 | End: 2023-01-01 | Stop reason: HOSPADM

## 2023-01-01 RX ORDER — MULTIVITAMIN WITH IRON
1 TABLET ORAL DAILY
Status: DISCONTINUED | OUTPATIENT
Start: 2023-01-01 | End: 2023-01-01 | Stop reason: HOSPADM

## 2023-01-01 RX ORDER — FINASTERIDE 5 MG/1
5 TABLET, FILM COATED ORAL DAILY
Status: DISCONTINUED | OUTPATIENT
Start: 2023-01-01 | End: 2023-01-01 | Stop reason: HOSPADM

## 2023-01-01 RX ORDER — POTASSIUM CHLORIDE 20 MEQ/1
20 TABLET, EXTENDED RELEASE ORAL
Status: DISCONTINUED | OUTPATIENT
Start: 2023-01-01 | End: 2023-01-01 | Stop reason: HOSPADM

## 2023-01-01 RX ORDER — FUROSEMIDE 20 MG/1
20 TABLET ORAL DAILY
Status: DISCONTINUED | OUTPATIENT
Start: 2023-01-01 | End: 2023-01-01 | Stop reason: HOSPADM

## 2023-01-01 RX ORDER — SODIUM CHLORIDE 0.9 % (FLUSH) 0.9 %
5-40 SYRINGE (ML) INJECTION 2 TIMES DAILY
Status: DISCONTINUED | OUTPATIENT
Start: 2023-01-01 | End: 2023-01-01 | Stop reason: HOSPADM

## 2023-01-01 RX ORDER — ATORVASTATIN CALCIUM 40 MG/1
40 TABLET, FILM COATED ORAL DAILY
Status: DISCONTINUED | OUTPATIENT
Start: 2023-01-01 | End: 2023-01-01 | Stop reason: HOSPADM

## 2023-01-01 RX ORDER — DIGOXIN 125 MCG
125 TABLET ORAL DAILY
Status: DISCONTINUED | OUTPATIENT
Start: 2023-01-01 | End: 2023-01-01 | Stop reason: HOSPADM

## 2023-01-01 RX ORDER — LISINOPRIL 10 MG/1
10 TABLET ORAL DAILY
Status: DISCONTINUED | OUTPATIENT
Start: 2023-01-01 | End: 2023-01-01 | Stop reason: HOSPADM

## 2023-01-01 RX ORDER — ASPIRIN 81 MG/1
81 TABLET ORAL DAILY
Status: DISCONTINUED | OUTPATIENT
Start: 2023-01-01 | End: 2023-01-01 | Stop reason: HOSPADM

## 2023-01-01 RX ADMIN — SODIUM CHLORIDE: 9 INJECTION, SOLUTION INTRAVENOUS at 06:12

## 2023-01-01 RX ADMIN — FINASTERIDE 5 MG: 5 TABLET, FILM COATED ORAL at 10:17

## 2023-01-01 RX ADMIN — PETROLATUM: 420 OINTMENT TOPICAL at 19:53

## 2023-01-01 RX ADMIN — BUDESONIDE AND FORMOTEROL FUMARATE DIHYDRATE 2 PUFF: 160; 4.5 AEROSOL RESPIRATORY (INHALATION) at 13:36

## 2023-01-01 RX ADMIN — SODIUM CHLORIDE: 9 INJECTION, SOLUTION INTRAVENOUS at 01:07

## 2023-01-01 RX ADMIN — DOXYCYCLINE HYCLATE 100 MG: 100 CAPSULE ORAL at 21:31

## 2023-01-01 RX ADMIN — DIGOXIN 125 MCG: 0.12 TABLET ORAL at 10:16

## 2023-01-01 RX ADMIN — WATER 1000 MG: 1 INJECTION INTRAMUSCULAR; INTRAVENOUS; SUBCUTANEOUS at 18:11

## 2023-01-01 RX ADMIN — ATORVASTATIN CALCIUM 40 MG: 40 TABLET, FILM COATED ORAL at 21:31

## 2023-01-01 RX ADMIN — ASPIRIN 81 MG: 81 TABLET, COATED ORAL at 10:16

## 2023-01-01 RX ADMIN — LISINOPRIL 10 MG: 10 TABLET ORAL at 09:03

## 2023-01-01 RX ADMIN — SODIUM CHLORIDE: 9 INJECTION, SOLUTION INTRAVENOUS at 17:15

## 2023-01-01 RX ADMIN — GUAIFENESIN AND DEXTROMETHORPHAN 5 ML: 100; 10 SYRUP ORAL at 17:17

## 2023-01-01 RX ADMIN — DOXYCYCLINE 100 MG: 100 INJECTION, POWDER, LYOPHILIZED, FOR SOLUTION INTRAVENOUS at 18:34

## 2023-01-01 RX ADMIN — FUROSEMIDE 20 MG: 10 INJECTION, SOLUTION INTRAMUSCULAR; INTRAVENOUS at 15:41

## 2023-01-01 RX ADMIN — DOXYCYCLINE HYCLATE 100 MG: 100 CAPSULE ORAL at 10:16

## 2023-01-01 RX ADMIN — DIGOXIN 125 MCG: 0.12 TABLET ORAL at 09:03

## 2023-01-01 RX ADMIN — FUROSEMIDE 20 MG: 20 TABLET ORAL at 10:17

## 2023-01-01 RX ADMIN — ISOSORBIDE MONONITRATE 30 MG: 30 TABLET, EXTENDED RELEASE ORAL at 10:17

## 2023-01-01 RX ADMIN — METOPROLOL SUCCINATE 100 MG: 100 TABLET, EXTENDED RELEASE ORAL at 09:02

## 2023-01-01 RX ADMIN — MULTIVITAMIN TABLET 1 TABLET: TABLET at 10:18

## 2023-01-01 RX ADMIN — DEXAMETHASONE SODIUM PHOSPHATE 4 MG: 4 INJECTION, SOLUTION INTRAMUSCULAR; INTRAVENOUS at 19:52

## 2023-01-01 RX ADMIN — BUDESONIDE AND FORMOTEROL FUMARATE DIHYDRATE 2 PUFF: 160; 4.5 AEROSOL RESPIRATORY (INHALATION) at 19:52

## 2023-01-01 RX ADMIN — DEXAMETHASONE SODIUM PHOSPHATE 4 MG: 4 INJECTION, SOLUTION INTRAMUSCULAR; INTRAVENOUS at 10:17

## 2023-01-01 RX ADMIN — BARICITINIB 2 MG: 2 TABLET, FILM COATED ORAL at 10:16

## 2023-01-01 RX ADMIN — SODIUM CHLORIDE, PRESERVATIVE FREE 10 ML: 5 INJECTION INTRAVENOUS at 21:33

## 2023-01-01 RX ADMIN — PETROLATUM: 420 OINTMENT TOPICAL at 21:32

## 2023-01-01 RX ADMIN — PETROLATUM: 420 OINTMENT TOPICAL at 13:36

## 2023-01-01 RX ADMIN — PETROLATUM: 420 OINTMENT TOPICAL at 10:18

## 2023-01-01 RX ADMIN — WATER 1000 MG: 1 INJECTION INTRAMUSCULAR; INTRAVENOUS; SUBCUTANEOUS at 17:14

## 2023-01-01 RX ADMIN — METOPROLOL SUCCINATE 100 MG: 100 TABLET, EXTENDED RELEASE ORAL at 10:17

## 2023-01-01 RX ADMIN — SODIUM CHLORIDE 1000 ML: 9 INJECTION, SOLUTION INTRAVENOUS at 17:15

## 2023-01-01 RX ADMIN — BARICITINIB 2 MG: 2 TABLET, FILM COATED ORAL at 21:32

## 2023-01-01 RX ADMIN — BUDESONIDE AND FORMOTEROL FUMARATE DIHYDRATE 2 PUFF: 160; 4.5 AEROSOL RESPIRATORY (INHALATION) at 21:32

## 2023-01-01 RX ADMIN — FUROSEMIDE 20 MG: 20 TABLET ORAL at 09:02

## 2023-01-01 RX ADMIN — SODIUM CHLORIDE, PRESERVATIVE FREE 10 ML: 5 INJECTION INTRAVENOUS at 19:52

## 2023-01-01 RX ADMIN — LISINOPRIL 10 MG: 10 TABLET ORAL at 10:16

## 2023-01-01 RX ADMIN — SODIUM CHLORIDE, PRESERVATIVE FREE 10 ML: 5 INJECTION INTRAVENOUS at 01:03

## 2023-01-01 RX ADMIN — MULTIVITAMIN TABLET 1 TABLET: TABLET at 09:03

## 2023-01-01 RX ADMIN — DEXAMETHASONE SODIUM PHOSPHATE 4 MG: 4 INJECTION, SOLUTION INTRAMUSCULAR; INTRAVENOUS at 21:32

## 2023-01-01 RX ADMIN — FINASTERIDE 5 MG: 5 TABLET, FILM COATED ORAL at 09:02

## 2023-01-01 RX ADMIN — POTASSIUM CHLORIDE 20 MEQ: 1500 TABLET, EXTENDED RELEASE ORAL at 09:03

## 2023-01-01 RX ADMIN — BUDESONIDE AND FORMOTEROL FUMARATE DIHYDRATE 2 PUFF: 160; 4.5 AEROSOL RESPIRATORY (INHALATION) at 10:18

## 2023-01-01 RX ADMIN — ASPIRIN 81 MG: 81 TABLET, COATED ORAL at 09:03

## 2023-01-01 RX ADMIN — FUROSEMIDE 20 MG: 10 INJECTION, SOLUTION INTRAMUSCULAR; INTRAVENOUS at 04:03

## 2023-01-01 RX ADMIN — POTASSIUM CHLORIDE 20 MEQ: 1500 TABLET, EXTENDED RELEASE ORAL at 10:16

## 2023-01-01 RX ADMIN — DOXYCYCLINE HYCLATE 100 MG: 100 CAPSULE ORAL at 19:51

## 2023-01-01 RX ADMIN — GUAIFENESIN AND DEXTROMETHORPHAN 5 ML: 100; 10 SYRUP ORAL at 19:51

## 2023-01-01 RX ADMIN — ATORVASTATIN CALCIUM 40 MG: 40 TABLET, FILM COATED ORAL at 19:51

## 2023-01-01 RX ADMIN — ISOSORBIDE MONONITRATE 30 MG: 30 TABLET, EXTENDED RELEASE ORAL at 09:02

## 2023-01-01 RX ADMIN — DOXYCYCLINE HYCLATE 100 MG: 100 CAPSULE ORAL at 09:03

## 2023-01-01 RX ADMIN — WATER 2000 MG: 1 INJECTION INTRAMUSCULAR; INTRAVENOUS; SUBCUTANEOUS at 18:29

## 2023-01-01 RX ADMIN — SODIUM CHLORIDE, PRESERVATIVE FREE 10 ML: 5 INJECTION INTRAVENOUS at 09:22

## 2023-01-01 ASSESSMENT — PAIN - FUNCTIONAL ASSESSMENT
PAIN_FUNCTIONAL_ASSESSMENT: NONE - DENIES PAIN
PAIN_FUNCTIONAL_ASSESSMENT: NONE - DENIES PAIN

## 2023-01-12 NOTE — ED PROVIDER NOTES
Emergency [address]  [address] [facility] EMERGENCY DEPARTMENT    Patient: Shanti Molina  MRN: [id number]  : 1922  Date of Evaluation: 2023  ED Supervising Physician: Hardy Persaud MD    I independently examined and evaluated Shanti Molina. This will serve as my Supervisory note as the primary clinician of record and shared attestation. I did perform a substantive portion of the visit including all aspects of the Medical Decision Making. I wore appropriate PPE for the entirety of this encounter. In brief, Shanti Molina is a 80 y.o. male that presents to the emergency department complaining of weakness and fatigue he is COVID-positive    Focused exam: Patient is awake alert has a completely normal mental status is oriented x3. No increased work of breathing abdomen is benign    Brief ED course/MDM: Patient presenting COVID-positive with nausea vomiting concern for dehydration sent in by his primary care physician. Plan check labs to rule out life-threatening dehydration or electrolyte abnormalities, give fluids and antiemetics and discussed admission versus discharge with Dr. Tomasa Marcus. Disposition pending    5:48 PM  Patient has significantly elevated troponin still has no chest pain EKG shows normal sinus rhythm without ST elevations he does have T wave inversions in the lateral leads. Patients symptoms are consistent with sepsis, severe sepsis, or septic shock (If yes use \".sepsiscoremeasure\"): No    Diagnosis/Plan: COVID    All diagnostic, treatment, and disposition decisions were made by myself in conjunction with the REECE/Resident. For all further details of the patient's emergency department visit, please see their documentation.     Comment: Please note this report has been produced using speech recognition software and may contain errors related to that system including errors in grammar, punctuation, and spelling, as well as words and phrases that may be inappropriate. If there are any questions or concerns please feel free to contact the dictating provider for clarification.     Cielo Mattson MD  4619 Oli Kaufman MD  01/12/23 [facility] Mandeep Gordillo MD  01/12/23 2578

## 2023-01-13 NOTE — PROGRESS NOTES
Spoke with Daughter Patrizia Sue regarding code status. She states patient is a [facility] and will be bringing paperwork in to confirm this. Updated on patient status and plan of care.

## 2023-01-13 NOTE — CONSULTS
[facility] [address] [facility]  [address], [address]  [facility], [address] [place] [address]    Pulmonary Consultation      Reason for Consult:  sob and cough    Requesting Physician:  Mia Travis DO    CHIEF COMPLAINT:  fatigue    History Obtained From:  electronic medical record    HISTORY OF PRESENT ILLNESS:                The patient is a 80 y.o. male who presents with weakness and fatigue. Unable to get much info from pt, claims was ok with breathing, not coughing much, knows in hospital but confused with time (Nov 2022). When asked why in hospital, claims because of \"clover\". When asked when he found out about it, he said one morning. EMR mentions Wednesday (?) last week or 2 days ago, mention of increased weakness with decreased po intake, brought here for further management. Coughing during exam, claims that's normal for him, claims not much sputum production. Had NC on his chin, on 1 li NC.     Past Medical History:        Diagnosis Date    Cancer ([facility] [address]     colon    CHF (congestive heart failure) (HCC)     Hyperlipidemia     Hypertension     Kidney failure        Past Surgical History:        Procedure Laterality Date    COLON SURGERY      ECHOCARDIOGRAM COMPLETE 2D W DOPPLER W COLOR  10/8/2012         HERNIA REPAIR         Current Medications:    Current Facility-Administered Medications: aspirin EC tablet 81 mg, 81 mg, Oral, Daily  atorvastatin (LIPITOR) tablet 40 mg, 40 mg, Oral, Daily  digoxin (LANOXIN) tablet 125 mcg, 125 mcg, Oral, Daily  finasteride (PROSCAR) tablet 5 mg, 5 mg, Oral, Daily  furosemide (LASIX) tablet 20 mg, 20 mg, Oral, Daily  isosorbide mononitrate (IMDUR) extended release tablet 30 mg, 30 mg, Oral, Daily  lisinopril (PRINIVIL;ZESTRIL) tablet 10 mg, 10 mg, Oral, Daily  metoprolol succinate (TOPROL XL) extended release tablet 100 mg, 100 mg, Oral, Daily  multivitamin 1 tablet, 1 tablet, Oral, Daily  potassium chloride (KLOR-CON M) extended release tablet 20 mEq, 20 mEq, Oral, Daily with breakfast  cefTRIAXone (ROCEPHIN) 1,000 mg in sterile water 10 mL IV syringe, 1,000 mg, IntraVENous, Q24H  doxycycline hyclate (VIBRAMYCIN) capsule 100 mg, 100 mg, Oral, 2 times per day  0.9 % sodium chloride infusion, , IntraVENous, Continuous  sodium chloride flush 0.9 % injection 5-40 mL, 5-40 mL, IntraVENous, BID  guaiFENesin-dextromethorphan (ROBITUSSIN DM) 100-10 MG/5ML syrup 5 mL, 5 mL, Oral, Q4H PRN  budesonide-formoterol (SYMBICORT) 160-4.5 MCG/ACT inhaler 2 puff, 2 puff, Inhalation, BID  white petrolatum ointment, , Topical, BID  ondansetron (ZOFRAN) injection 4 mg, 4 mg, IntraVENous, Q6H PRN  enoxaparin (LOVENOX) injection 60 mg, 1 mg/kg, SubCUTAneous, Once    Allergies:  Patient has no known allergies. Social History:    TOBACCO:   reports that he has never smoked. He has never used smokeless tobacco.    Family History:       Adopted: Yes       REVIEW OF SYSTEMS:    Review of systems not obtained due to patient factors - mental status    PHYSICAL EXAM:      Vitals:    BP (!) 117/94   Pulse (!) 102   Temp 98 °F (36.7 °C) (Oral)   Resp 18   Ht 5' 7\" (1.702 m)   Wt 135 lb 12.8 oz (61.6 kg)   SpO2 96%   BMI 21.27 kg/m²     EYES:  Lids and lashes normal, pupils equal, round and reactive to light, extra ocular muscles intact, sclera clear, conjunctiva normal  ENT:  Normocephalic, without obvious abnormality, atraumatic, sinuses nontender on palpation, external ears without lesions, oral pharynx with moist mucus membranes, tonsils without erythema or exudates, gums normal and good dentition. LUNGS:  bilateral ronchi with cough  CARDIOVASCULAR:  Normal apical impulse, regular rate and rhythm, normal S1 and S2, no S3 or S4, and no murmur noted  ABDOMEN:  No scars, normal bowel sounds, soft, non-distended, non-tender, no masses palpated, no hepatosplenomegally  MUSCULOSKELETAL:  There is no redness, warmth, or swelling of the joints. Full range of motion noted. Motor strength is 5 out of 5 all extremities bilaterally. Tone is normal.  NEUROLOGIC:  awake, conversant, oriented to place (hospital) but not time (Nov 2022), moving extremities    DATA:    CBC:   Recent Labs     01/12/23  1625 01/13/23  0430   WBC 7.8 7.1   HGB 14.2 14.0   HCT 42.1 42.4   MCV 87.9 88.0   * 103*       BMP:  Recent Labs     01/12/23  1625 01/13/23  0430    138   K 4.9 4.7    106   CO2 20* 21*   BUN 72* 62*   CREATININE 1.7* 1.6*    ALB:3,BILIDIR:3,BILITOT:3,ALKPHOS:3)@    PT/INR: No results for input(s): PROTIME, INR in the last 72 hours. ABG:   No results for input(s): PH, PO2, PCO2, HCO3, BE, O2SAT, METHB, O2HB, COHB, O2CON, HHB, THB in the last 72 hours. Radiology Review:  CXR reviewed with increased GG opacities right lower lung field and left lung field    IMPRESSION/RECOMMENDATIONS:      COVID  Hypoxia  CHF  CKD    Watch fluid balance, diurese as per PCP  Check CRP and procalcitionin, ESR low, may consider baricitinib if elevated, cont with nebs for now  If (+) since last week, may be too late for remdesivir, observe for now  Cont with oxygen, taper as tolerated  Placed on symbicort, will have to see if can use properly, change to nebs if not  Encourage incentive spirometer use  OOB to chair with assistance and if able to      Time at the bedside, reviewing labs and radiographs, reviewing notes and consultations, discussing with staff and family was more than 55 minutes. Thanks for letting us see this patient in consultation. Please contact us with any questions. Office [phone] or after hours through [employer], x [phone].

## 2023-01-13 NOTE — H&P
[place] [address]                              HISTORY AND PHYSICAL    PATIENT NAME: PAIGE MACKEY                     :        1922  MED REC NO:   [id number]                            ROOM:       0448  ACCOUNT NO:   [id number]                           ADMIT DATE: 2023  PROVIDER:     Hua Graham DO    HISTORY OF PRESENT ILLNESS:  This is a 89-year-old white male,  longstanding history of ischemic cardiomyopathy and hypertension, had  recently been diagnosed with COVID as an outpatient.  Family states that  he seemed to be okay and he was simply treated with a Z-Orlin and some  Decadron as an outpatient.  However, after completing his course of  therapy, he seemed to get a little worse.  His cough got worse.  His  secretions seemed to be a little deeper.  He could not clear himself  during his cough.  He was weak.  He was losing his appetite, not eating  and not really ambulating or getting out of bed.  So, we brought him  back into the emergency room.  Of course, he is still positive for COVID  and his chest x-ray shows bilateral fluffy airspace infiltrates  consistent with what appears to be COVID pneumonia.  We are going to go  ahead and bring him in, start him on some antibiotics, and check his  inflammatory markers, maybe a little bit too late for remdesivir, but we  are going to see how he does and follow him along with Pulmonary  advisement.    PAST MEDICAL HISTORY:  Significant for colon cancer, congestive heart  failure, hyperlipidemia, hypertension, and chronic kidney disease.    PAST SURGICAL HISTORY:  Significant for colonoscopy, colon surgery,  echocardiograms, and hernia repair.    CURRENT MEDICATIONS:  Included atorvastatin 40 daily, aspirin 81 daily,  digoxin 0.625, Proscar 5, Lasix 20, isosorbide 30, lisinopril 10,  metoprolol 100 XL, multivitamins, and potassium  supplementation. ALLERGIES:  He has no known allergies. SOCIAL HISTORY:  He is a lifelong nonsmoker. FAMILY MEDICAL HISTORY:  Not determined as he has been adopted. REVIEW OF SYSTEMS:  Negative for vertigo, cephalgia, ringing in the  ears, hearing loss, difficulty swallowing, hoarseness in his voice, or  bloody noses. Mental status has changed a bit. He seemed to be  somewhat confused and a little bit more lethargic. He has no chest pain  or palpitations, but does have a longstanding history of coronary artery  disease and nonischemic cardiomyopathy. He has cough, wheeze, and  shortness of breath. No hemoptysis or pleuritic pain. No nausea,  vomiting, diarrhea, constipation, blood in his stool, or change in  weight. No urgency, frequency, dysuria, or hematuria. He does have  prostatic hypertrophy. Endocrine system is negative for diabetes or  thyroid disorder. Musculoskeletal system positive for degenerative  joint and degenerative disk disease. Skin is with skin cancer of his  scalp and various other areas. Very thin skinned.  _____ type of  lacerations and bruises. Psychiatric system negative for anxiety or  depression. Neurologic system negative for CVA, seizures, tremors,  tics, or TIAs. PHYSICAL EXAMINATION:  GENERAL:  Exam shows this to be a 8-year-old white male, in moderate  distress with the above complaints. HEENT:  Head, eyes, ears, nose, and throat examination shows the head to  be normocephalic and atraumatic. The pupils are equal and reactive to  light and accommodation. Extraocular eye muscles are intact. Tympanic  membranes are clear. Ear canals are patent. Oral mucosa pink and  moist.  NECK:  Veins are flat and nondistended. No carotid bruits. CHEST:  Symmetrical.  HEART:  Had a regular rate and rhythm without murmur, gallops, or  friction rubs. LUNGS:  Showed diminished breath sounds. Scattered rhonchi and rales. Little bit of stridor.   Little bit of rhonchi.  _____ type breath  sounds. ABDOMEN:  Soft, nontender, and nondistended. Bowel sounds are present  in all four quadrants. EXTERNAL GENITALIA:  Intact. MUSCULOSKELETAL:  Peripheral pulses are diminished. Legs without or  maybe just trace edema. BACK:  Spine shows physiologic curve. SKIN:  Dry. Turgor is fair. ASSESSMENT AND PLAN:  Initial impression at this time is COVID pneumonia  and a little bit hypoxia. We are going to go ahead and start him on  some aerosols. Continue with antibiotics in the form of Rocephin or  doxycycline and kind of follow him along and see how he does. At the  time of admission, his condition is fair. His prognosis is guarded.         Komal Doherty DO    D: 01/13/2023 16:35:33       T: 01/13/2023 16:38:02     [email or web address]  Job#: [id number]     Doc#: [id number]    CC:

## 2023-01-13 NOTE — ED PROVIDER NOTES
[facility] [address] [place] [address] [address]        Pt Name: Diogo Ross  MRN: [id number]  [place] 4/27/1922  Date of evaluation: 1/12/2023  Provider: Siri Aguilar DO  PCP: Nolan Garcia DO  Note Started: 3:22 AM EST 1/13/23    CHIEF COMPLAINT       Chief Complaint   Patient presents with    Fatigue     Pt tested positive for covid last wednesday since then has been having generalized weakness and not feeling good. HISTORY OF PRESENT ILLNESS: 1 or more Elements   History From: Patient and daughter    Limitations to history : clinical condition and fatigue from patient    Diogo Ross is a 80 y.o. male with past medical history of hyperlipidemia, hypertension, CHF, CKD and unspecified cancer who presents to the emergency department for evaluation of generalized weakness and fatigue. Patient was brought in from home for further evaluation. Daughter is at bedside and provided most of the history. Patient himself is somewhat of a poor historian and appears very drowsy. He does follow commands and answer simple questions by shaking or nodding his head. Daughter states that the patient tested positive for COVID last Wednesday and he has had generalized weakness and fatigue over the past few days worsening today. Patient has not been eating and drinking well at home. He is sleeping for most of the day and feels very fatigued and weak. Family apparently are all sick with similar symptoms and are testing positive for COVID as well. Patient denies any active chest pain, shortness of breath, nausea, vomiting, headache, syncope, urinary symptoms or constipation. On initial evaluation, patient is nontoxic-appearing and in no acute distress. He does appear elderly and frail. No signs of respiratory distress noted.     Nursing Notes were all reviewed and agreed with or any disagreements were addressed in the HPI.    ROS:   Pertinent positives and negatives are stated within HPI, all other systems reviewed and are negative.    --------------------------------------------- PAST HISTORY ---------------------------------------------  Past Medical History:  has a past medical history of [facility] ([facility] CHF (congestive heart failure) ([facility] Hyperlipidemia, Hypertension, and Kidney failure. Past Surgical History:  has a past surgical history that includes Colon surgery; hernia repair; and ECHO Complete 2D W Doppler W Color (10/8/2012). Social History:  reports that he has never smoked. He has never used smokeless tobacco. He reports that he does not drink alcohol and does not use drugs. Family History: family history is not on file. He was adopted. The patients home medications have been reviewed. Allergies: Patient has no known allergies. ---------------------------------------------------PHYSICAL EXAM--------------------------------------    Constitutional/General: Alert and oriented x3, well appearing, non toxic in NAD, elderly and frail-appearing  Head: Normocephalic and atraumatic  Mouth: Oropharynx clear, handling secretions, no trismus  Neck: Supple, full ROM,  Pulmonary: Globally diminished, likely worsened by poor inspiratory effort. Lungs clear to auscultation bilaterally, no wheezes, rales, or rhonchi. Not in respiratory distress  Cardiovascular:  Regular rate. Regular rhythm. No murmurs  Chest: no chest wall tenderness  Abdomen: Soft. Non tender. Non distended. No rebound, guarding, or rigidity. No pulsatile masses appreciated. Musculoskeletal: Moves all extremities x 4. Warm and well perfused, no clubbing, cyanosis, or edema. Capillary refill <3 seconds  Skin: warm and dry. No rashes. Neurologic: GCS 15, no gross focal neurologic deficits  Psych: Normal Affect    -------------------------------------------------- RESULTS -------------------------------------------------  I have personally reviewed all laboratory and imaging results for this patient.  Results are listed below.      LABS:  Results for orders placed or performed during the hospital encounter of 01/12/23   COVID-19, Rapid    Specimen: Nasopharyngeal Swab   Result Value Ref Range    SARS-CoV-2, NAAT DETECTED (A) Not Detected   RAPID INFLUENZA A/B ANTIGENS    Specimen: Nasopharyngeal   Result Value Ref Range    Influenza A by PCR Not Detected Not Detected    Influenza B by PCR Not Detected Not Detected   CBC with Auto Differential   Result Value Ref Range    WBC 7.8 4.5 - 11.5 E9/L    RBC 4.79 3.80 - 5.80 E12/L    Hemoglobin 14.2 12.5 - 16.5 g/dL    Hematocrit 42.1 37.0 - 54.0 %    MCV 87.9 80.0 - 99.9 fL    MCH 29.6 26.0 - 35.0 pg    MCHC 33.7 32.0 - 34.5 %    RDW 13.2 11.5 - 15.0 fL    Platelets 020 (L) 084 - 450 E9/L    MPV 12.1 (H) 7.0 - 12.0 fL    Neutrophils % 94.8 (H) 43.0 - 80.0 %    Lymphocytes % 1.7 (L) 20.0 - 42.0 %    Monocytes % 2.6 2.0 - 12.0 %    Eosinophils % 0.0 0.0 - 6.0 %    Basophils % 0.0 0.0 - 2.0 %    Neutrophils Absolute 7.41 (H) 1.80 - 7.30 E9/L    Lymphocytes Absolute 0.23 (L) 1.50 - 4.00 E9/L    Monocytes Absolute 0.23 0.10 - 0.95 E9/L    Eosinophils Absolute 0.00 (L) 0.05 - 0.50 E9/L    Basophils Absolute 0.00 0.00 - 0.20 E9/L    Atypical Lymphocytes Relative 0.9 0.0 - 4.0 %    nRBC 0.0 /100 WBC    Polychromasia 1+     Hypochromia 1+     Poikilocytes 2+     Acanthocytes 1+     [place] Cells 2+    CMP   Result Value Ref Range    Sodium 138 132 - 146 mmol/L    Potassium 4.9 3.5 - 5.0 mmol/L    Chloride 101 98 - 107 mmol/L    CO2 20 (L) 22 - 29 mmol/L    Anion Gap 17 (H) 7 - 16 mmol/L    Glucose 117 (H) 74 - 99 mg/dL    BUN 72 (H) 6 - 23 mg/dL    Creatinine 1.7 (H) 0.7 - 1.2 mg/dL    Est, Glom Filt Rate 35 >=60 mL/min/1.73    Calcium 8.8 8.6 - 10.2 mg/dL    Total Protein 6.9 6.4 - 8.3 g/dL    Albumin 3.3 (L) 3.5 - 5.2 g/dL    Total Bilirubin 1.4 (H) 0.0 - 1.2 mg/dL    Alkaline Phosphatase 72 40 - 129 U/L    ALT 37 0 - 40 U/L    AST 51 (H) 0 - 39 U/L   Urinalysis with Microscopic   Result Value Ref Range    Color, UA Yellow Straw/Yellow    Clarity, UA Clear Clear    Glucose, Ur Negative Negative mg/dL    Bilirubin Urine Negative Negative    Ketones, Urine Negative Negative mg/dL    Specific Gravity, UA 1.015 1.005 - 1.030    Blood, Urine TRACE-INTACT Negative    pH, UA 6.0 5.0 - 9.0    Protein, UA 30 (A) Negative mg/dL    Urobilinogen, Urine 0.2 <2.0 E.U./dL    Nitrite, Urine Negative Negative    Leukocyte Esterase, Urine Negative Negative    Hyaline Casts, UA 3-5 (A) 0 - 2 /LPF    WBC, UA 0-1 0 - 5 /HPF    RBC, UA 1-3 0 - 2 /HPF    Bacteria, UA MODERATE (A) None Seen /HPF   Troponin   Result Value Ref Range    Troponin, High Sensitivity 684 (H) 0 - 11 ng/L   SPECIMEN REJECTION   Result Value Ref Range    Rejected Test TROP     Reason for Rejection see below    SPECIMEN REJECTION   Result Value Ref Range    Rejected Test TROP     Reason for Rejection see below    Troponin   Result Value Ref Range    Troponin, High Sensitivity 609 (H) 0 - 11 ng/L   EKG 12 Lead   Result Value Ref Range    Ventricular Rate 94 BPM    Atrial Rate 94 BPM    P-R Interval 198 ms    QRS Duration 142 ms    Q-T Interval 394 ms    QTc Calculation (Bazett) 492 ms    P Axis 42 degrees    R Axis -54 degrees    T Axis 114 degrees       RADIOLOGY:   Interpretation per the Radiologist below, if available at the time of this note:    XR CHEST PORTABLE   Final Result   Interstitial and hazy opacities bilaterally notable throughout left lung and   right lung base could indicate bilateral pneumonia. XR CHEST PORTABLE    Result Date: 1/12/2023  EXAMINATION: ONE XRAY VIEW OF THE CHEST 1/12/2023 4:27 pm COMPARISON: July 10, 2020 HISTORY: ORDERING SYSTEM PROVIDED HISTORY: weakness TECHNOLOGIST PROVIDED HISTORY: Reason for exam:->weakness FINDINGS: Interstitial and hazy opacities throughout left lung and in the right lower lung field. The heart appears to be normal in size. No obvious pleural effusion. No pneumothorax. Interstitial and hazy opacities bilaterally notable throughout left lung and right lung base could indicate bilateral pneumonia. No results found. See preliminary interpretation by me below. EKG: This EKG is signed and interpreted by me. Normal sinus rhythm with ventricular rate of 94 bpm.  Left axis deviation. SC interval normal.  QTc prolonged at 492 MS. Right bundle branch block. Left anterior fascicular block. Bifascicular block. Left ventricular hypertrophy. No evidence of acute STEMI. Stable with no significant changes compared to previous EKG on 7/10/2020.      ------------------------- NURSING NOTES AND VITALS REVIEWED ---------------------------   The nursing notes within the ED encounter and vital signs as below have been reviewed by myself. BP (!) 152/85   Pulse 96   Temp 97.7 °F (36.5 °C) (Oral)   Resp 20   Ht 5' 7\" (1.702 m)   Wt 135 lb 12.8 oz (61.6 kg)   SpO2 94%   BMI 21.27 kg/m²   Oxygen Saturation Interpretation: Normal    The patients available past medical records and past encounters were reviewed.         ------------------------------ ED COURSE/MEDICAL DECISION MAKING----------------------  Medications   ondansetron (ZOFRAN) injection 4 mg (has no administration in time range)   enoxaparin (LOVENOX) injection 60 mg (60 mg SubCUTAneous Not Given 1/12/23 2249)   aspirin EC tablet 81 mg (has no administration in time range)   atorvastatin (LIPITOR) tablet 40 mg (has no administration in time range)   digoxin (LANOXIN) tablet 125 mcg (has no administration in time range)   finasteride (PROSCAR) tablet 5 mg (has no administration in time range)   furosemide (LASIX) tablet 20 mg (has no administration in time range)   isosorbide mononitrate (IMDUR) extended release tablet 30 mg (has no administration in time range)   lisinopril (PRINIVIL;ZESTRIL) tablet 10 mg (has no administration in time range)   metoprolol succinate (TOPROL XL) extended release tablet 100 mg (has no administration in time range)   multivitamin 1 tablet (has no administration in time range)   potassium chloride (KLOR-CON M) extended release tablet 20 mEq (has no administration in time range)   cefTRIAXone (ROCEPHIN) 1,000 mg in sterile water 10 mL IV syringe (has no administration in time range)   doxycycline hyclate (VIBRAMYCIN) capsule 100 mg (has no administration in time range)   0.9 % sodium chloride infusion ( IntraVENous New Bag 1/13/23 0107)   sodium chloride flush 0.9 % injection 5-40 mL (10 mLs IntraVENous Given 1/13/23 0103)   0.9 % NaCl bolus (1,000 mLs IntraVENous New Bag 1/12/23 1715)   cefTRIAXone (ROCEPHIN) 2,000 mg in sterile water 20 mL IV syringe (2,000 mg IntraVENous Given 1/12/23 1829)   doxycycline (VIBRAMYCIN) 100 mg in dextrose 5 % 100 mL IVPB (0 mg IntraVENous Stopped 1/12/23 2018)       Medical Decision Making/Differential Diagnosis:    CC/HPI Summary, Pertinent Physical Exam Findings, Social Determinants of health, Records Reviewed, DDx, testing done/not done, ED Course, Reassessment, disposition considerations/shared decision making with patient, consults, disposition:        Medical Decision Making:   I, Dr. Ayesha Madera am the resident physician of record. History From: Patient    Limitations to history : Clinical condition and fatigue from patient     Jarad Erickson is a 80 y.o. male who presents to the ED for generalized weakness and fatigue  Vital signs upon arrival BP (!) 152/85   Pulse 96   Temp 97.7 °F (36.5 °C) (Oral)   Resp 20   Ht 5' 7\" (1.702 m)   Wt 135 lb 12.8 oz (61.6 kg)   SpO2 94%   BMI 21.27 kg/m²     On initial evaluation, patient is nontoxic-appearing, afebrile, hemodynamically stable and in no acute distress. Working diagnoses include but not limited to acute viral syndrome, pneumonia, pulmonary embolism, anemia, CHF exacerbation, metabolic acidosis and UZIEL. Vitals within appropriate limits. No signs of acute respiratory distress.   Physical exam generally unremarkable. Patient did have globally diminished breath sounds that were likely exacerbated by poor inspiratory effort. No obvious wheezes, rales or rhonchi noted. Abdomen was soft, nontender nondistended without rebound, guarding or rigidity. No significant pretibial edema noted. Work-up in the emergency department as interpreted by me include CBC with no leukocytosis or significant anemia. WBC 7.8 and hemoglobin stable at 14.2. CMP with acute on chronic renal insufficiency, creatinine 1.7/BUN 72 with baseline creatinine of 1.5. No major electrolyte abnormalities noted including normal potassium of 4.9. Patient has mild hyperglycemia of 117 and a slight anion gap. Urinalysis does not suggest acute infection. Patient tested positive for COVID but negative for influenza. Troponin incidentally found to be elevated. Initial troponin of 684 with a repeat of 69. Troponin trending downward. Due to patient's age and family wishes, patient would not be a good candidate for catheterization despite elevated trops. Patient given subcutaneous Lovenox for ACS. Chest x-ray also noting bilateral pneumonia. Patient was given IV Rocephin 2 g and IV doxycycline 100 mg x 1 dose in the ED. Plan is admit the patient for further work-up, evaluation and management. Spoke with Dr. Stacie Dixon who accepted the patient for admission. Patient and family also agreeable with this plan after shared decision making. Patient remained hemodynamically stable in the ED. Non-plain film images such as CT, Ultrasound and MRI are read by the radiologist. Ismael Smoker radiographic images are visualized and preliminarily interpreted by the ED Provider with the below findings:    Chest x-ray with no obvious large pleural effusions or pneumothorax. Normal cardiac silhouette. Bilateral patchy hazy infiltrates.     Discussion with Other Profesionals : Admitting Team who accepted the patient for admission    Social Determinants : None    Records Reviewed : Other ECHO from 12/3/2017 reviewed and noting ejection fraction of 25 to 30% with stage II diastolic dysfunction    Chronic conditions: hyperlipidemia, hypertension, CHF, CKD and unspecified cancer     CONSULTS: Medicine team for admission      Disposition:   Admission       Consultation with Dr. Preston iVdal who accepted the patient for admission. The patient will be admitted for further treatment and evaluation for   1. Pneumonia due to infectious organism, unspecified laterality, unspecified part of lung    2. Elevated troponin    3. COVID-19    4. UZIEL (acute kidney injury) [facility])          Re-Evaluations/Consultations:             ED Course as of 01/13/23 0322   Thu Jan 12, 2023   2106 Patient accepted for admission by Dr. Preston Vidal. [PP]      ED Course User Index  [PP] Severino Lemons DO         This patient's ED course included: History, physical examination, reevaluation prior to disposition    This patient has remained hemodynamically stable during their ED course. Counseling: The emergency provider has spoken with the patient and daughter and discussed todays results, in addition to providing specific details for the plan of care and counseling regarding the diagnosis and prognosis. Questions are answered at this time and they are agreeable with the plan.       --------------------------------- IMPRESSION AND DISPOSITION ---------------------------------    IMPRESSION  1. Pneumonia due to infectious organism, unspecified laterality, unspecified part of lung    2. Elevated troponin    3. COVID-19    4. UZIEL (acute kidney injury) ([facility]        DISPOSITION  Disposition: Admit to telemetry  Patient condition is stable        NOTE: This report was transcribed using voice recognition software.  Every effort was made to ensure accuracy; however, inadvertent computerized transcription errors may be present         Severino Lemons DO  Resident  01/13/23 5256

## 2023-01-13 NOTE — PLAN OF CARE
Problem: Discharge Planning  Goal: Discharge to home or other facility with appropriate resources  1/13/2023 1212 by Peggy Cotter RN  Outcome: Progressing     Problem: ABCDS Injury Assessment  Goal: Absence of physical injury  1/13/2023 1212 by Peggy Cotter RN  Outcome: Progressing     Problem: Skin/Tissue Integrity  Goal: Absence of new skin breakdown  Description: 1. Monitor for areas of redness and/or skin breakdown  2. Assess vascular access sites hourly  3. Every 4-6 hours minimum:  Change oxygen saturation probe site  4. Every 4-6 hours:  If on nasal continuous positive airway pressure, respiratory therapy assess nares and determine need for appliance change or resting period.   1/13/2023 1212 by Peggy Cotter RN  Outcome: Progressing     Problem: Safety - Adult  Goal: Free from fall injury  1/13/2023 1212 by Peggy Cotter RN  Outcome: Progressing     Problem: Chronic Conditions and Co-morbidities  Goal: Patient's chronic conditions and co-morbidity symptoms are monitored and maintained or improved  Outcome: Progressing

## 2023-01-13 NOTE — CARE COORDINATION
CASE MANAGEMENT.... COVID + 1/12/23. Information provided by patient and from Sarath, son, over the phone [phone]. Mr Lilly lives alone in a 2 story home with bed/bath on main floor. Has cane/walker/grab bars in br/emr button. Has strong family support. [facility] 3hrs each day-services placed on hold when became Covid +. Also has a caregiver that comes 1 day/week x7 hrs. Is current with [facility] for wound care-confirmed with Caty-is accepted back-will follow-will need order once needs finalized. No h/o sheng. Mr Lilly is currently requiring o2 2lnc-new-nursing to wean as tolerated. He is on ivf and iv rocephin. Pulm on board. PT/OT consulted-await input. Sarath interested in [facility] at [place]. List of choices left at the bedside. Will cont to follow along and assist with needs accordingly.    OF NOTE... Attempted to complete ACP-patient not hearing questions. Spoke with Sarath. He states that his sister, Clair, has POA for Healthcare and Living Will. Family will bring forms in for chart.

## 2023-01-13 NOTE — FLOWSHEET NOTE
Inpatient Wound Care    Admit Date: 1/12/2023  3:48 PM    Reason for consult:  L leg    Significant history:  Admitted with Pneumonia, COVID-19. History of HLD, HTN, CHF, CKD. Wound history:  POA    Findings:     01/13/23 1319   Wound 01/12/23 Pretibial Distal;Left   Date First Assessed/Time First Assessed: 01/12/23 2335   Present on Hospital Admission: Yes  Location: Pretibial  Wound Location Orientation: Distal;Left   Wound Image    Wound Etiology   (vascular)   Dressing Status New dressing applied   Wound Cleansed Cleansed with saline   Dressing/Treatment Non adherent  (gauze, kerlix)   Dressing Change Due 01/14/23   Wound Length (cm) 2 cm   Wound Width (cm) 2 cm   Wound Depth (cm) 0.2 cm   Wound Surface Area (cm^2) 4 cm^2   Change in Wound Size % (l*w) -6.67   Wound Volume (cm^3) 0.8 cm^3   Drainage Amount Small   Drainage Description Serosanguinous   Odor None   Linda-wound Assessment Dry/flaky   Wound 01/13/23 Pretibial Right   Date First Assessed/Time First Assessed: 01/13/23 1321   Location: Pretibial  Wound Location Orientation: Right   Wound Image    Wound Cleansed Cleansed with saline   Dressing/Treatment Open to air   Wound Length (cm) 3 cm   Wound Width (cm) 1.5 cm   Wound Surface Area (cm^2) 4.5 cm^2   Wound Assessment   (encrusted)   Drainage Amount None   Odor None   Wound 01/13/23 Buttocks   Date First Assessed/Time First Assessed: 01/13/23 1323   Location: Buttocks   Wound Image    Wound Etiology Pressure Stage 2   Wound Cleansed   (bath wipe)   Wound Length (cm) 2 cm   Wound Width (cm) 2 cm   Wound Depth (cm) 0.2 cm   Wound Surface Area (cm^2) 4 cm^2   Wound Volume (cm^3) 0.8 cm^3   Wound Assessment Pink/red   Drainage Amount None   Odor None   Linda-wound Assessment Intact       Impression:  Stage 2 pressure injury on R buttocks, Chronic wound on L leg, 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 Interventions in place:  L leg wound cleansed with NSS. Applied Adaptic then Opticel.  Covered with ABD then secured with kerlix. R leg wound cleansed and left TONO. Plan:Daily dressing change to L leg, Aquaphor to buttocks, Low air loss bed, SOS precautions. **Informed Consent**    The patient has given verbal consent to have photos taken of L leg,  and inserted into their chart as part of their permanent medical record for purposes of documentation, treatment management and/or medical review. All Images taken on 1/13/23 of patient name: Sampson Dipak were transmitted and stored on secured [employer] located within [facility] by a registered [employer] Mobile Application Device.           Beatriz Turcios RN 1/13/2023 1:24 PM

## 2023-01-13 NOTE — ED NOTES
[facility] faxed and [address] called. ED staff to transport to [facility].      Arnaud Medley, [address] [facility]  [phone]

## 2023-01-13 NOTE — PROGRESS NOTES
Spoke with patient's son, Rajiv Rodriguez, regarding patient's home medication list. Son will call in am to go over medication list.

## 2023-01-14 NOTE — PROGRESS NOTES
[facility] [address] [facility]  [address] [facility], [address], [address] [facility]      Pulmonary Progress Note      SUBJECTIVE:  lying down in bed on 5 li NC, claims ok with breathing. Mention of increased sob late last night requiring NRFM, claims didn't think was any worse with breathing when that happened, minimal cough and not much sputum production.     OBJECTIVE    Medications    Continuous Infusions:   sodium chloride 75 mL/hr at 23 0612       Scheduled Meds:   aspirin EC  81 mg Oral Daily    atorvastatin  40 mg Oral Daily    digoxin  125 mcg Oral Daily    finasteride  5 mg Oral Daily    furosemide  20 mg Oral Daily    isosorbide mononitrate  30 mg Oral Daily    lisinopril  10 mg Oral Daily    metoprolol succinate  100 mg Oral Daily    multivitamin  1 tablet Oral Daily    potassium chloride  20 mEq Oral Daily with breakfast    cefTRIAXone (ROCEPHIN) IV  1,000 mg IntraVENous Q24H    doxycycline hyclate  100 mg Oral 2 times per day    sodium chloride flush  5-40 mL IntraVENous BID    budesonide-formoterol  2 puff Inhalation BID    white petrolatum   Topical BID    baricitinib  2 mg Oral Daily    dexamethasone  4 mg IntraVENous Q12H    enoxaparin  1 mg/kg SubCUTAneous Once       PRN Meds:guaiFENesin-dextromethorphan, ondansetron    Physical    VITALS:  /86   Pulse 87   Temp 97.4 °F (36.3 °C) (Axillary)   Resp 20   Ht 5' 7\" (1.702 m)   Wt 137 lb 8 oz (62.4 kg)   SpO2 96%   BMI 21.54 kg/m²     24HR INTAKE/OUTPUT:      Intake/Output Summary (Last 24 hours) at 2023 1321  Last data filed at 2023 0612  Gross per 24 hour   Intake --   Output 900 ml   Net -900 ml       24HR PULSE OXIMETRY RANGE:    SpO2  Av %  Min: 94 %  Max: 96 %    General appearance: alert, appears stated age, and cooperative  Lungs: rhonchi bilaterally with cough  Heart: regular rate and rhythm, S1, S2 normal, no murmur, click, rub or gallop  Abdomen: soft, non-tender; bowel sounds normal; no masses,  no organomegaly  Extremities: extremities normal, atraumatic, no cyanosis or edema  Neurologic: Mental status: Alert, oriented, thought content appropriate    Data    CBC:   Recent Labs     01/12/23  1625 01/13/23  0430   WBC 7.8 7.1   HGB 14.2 14.0   HCT 42.1 42.4   MCV 87.9 88.0   * 103*       BMP:  Recent Labs     01/12/23  1625 01/13/23  0430    138   K 4.9 4.7    106   CO2 20* 21*   BUN 72* 62*   CREATININE 1.7* 1.6*    ALB:3,BILIDIR:3,BILITOT:3,ALKPHOS:3)@    PT/INR: No results for input(s): PROTIME, INR in the last 72 hours. ABG:   No results for input(s): PH, PO2, PCO2, HCO3, BE, O2SAT, METHB, O2HB, COHB, O2CON, HHB, THB in the last 72 hours. Radiology/Other tests reviewed: CXR reviewed with increased edema/GG opacities bilateral lung fields    Assessment:     Principal Problem:    Pneumonia due to organism  Resolved Problems:    * No resolved hospital problems. *  COVID  Hypoxia  CHF  CKD      Plan:       Watch fluid balance, diurese as per PCP, increased BNP  Cont with baricitinib and decadron, observe inflammatory markers  Cont with oxygen, taper as tolerated  Cont with symbicort for now, observe use if needs changed to nebs or not  Encourage incentive spirometer use  OOB to chair if tolerates      Time at the bedside, reviewing labs and radiographs, reviewing notes and consultations, discussing with staff and family was more than 35 minutes. Thanks for letting us see this patient in consultation. Please contact us with any questions. Office [phone] or after hours through "[employer] x [phone].

## 2023-01-14 NOTE — PROGRESS NOTES
Contacted Dr. Andris Blizzard answering service regarding patient requiring more oxygen at this time.

## 2023-01-14 NOTE — PROGRESS NOTES
Comprehensive Nutrition Assessment    Type and Reason for Visit:  Wound, Consult, Initial (BR)    Nutrition Recommendations/Plan:    Consider changing diet to NPO until SLP evaluates & makes recommendation. Will order wound ONS/HP pudding both BID based on SLP evaluation. Will monitor     Malnutrition Assessment:  Malnutrition Status: At risk for malnutrition (Comment) (poor appetite) (01/14/23 1222)    Context:  Acute Illness     Findings of the 6 clinical characteristics of malnutrition:  Energy Intake:  50% or less of estimated energy requirements for 5 or more days  Weight Loss:  No significant weight loss     Body Fat Loss:  Unable to assess (COVID Isolation)     Muscle Mass Loss:  Unable to assess (COVID Isolation)    Fluid Accumulation:  No significant fluid accumulation     Strength:  Not Performed    Nutrition Assessment:    Admitted with Pneumonia, COVID-19. PMH: Colon CA, CHF, CKD. Pt at nut'l risk w reported appetite decrease x 1 wk. Pt in COVID Isolation. Swallow eval ordered d/t coughing/difficulty swallowing. Consider changing diet to NPO until SLP evaluates & makes recommendation. Will order wound ONS/HP pudding both BID based on SLP evaluation. Will monitor. Nutrition Related Findings:    ill fitting dentures,  dysphagia, GFR 38, Bili 1.4, AST 51. Decadron, Lasix, KCl, Multivitamins Wound Type: Stage II, Pressure Injury (Stage 2 pressure injury on R buttocks, Chronic wound on L leg, Encrusted wound on R leg per wound RN eval)       Current Nutrition Intake & Therapies:    Average Meal Intake: Unable to assess (currently no po intake recorded in EMR)  Average Supplements Intake: None Ordered  ADULT ORAL NUTRITION SUPPLEMENT; Breakfast, Dinner; Wound Healing Oral Supplement  ADULT ORAL NUTRITION SUPPLEMENT; Lunch, Dinner; Fortified Pudding Oral Supplement  ADULT DIET;  Dysphagia - Minced and Moist    Anthropometric Measures:  Height: 5' 7\" (170.2 cm)  Ideal Body Weight (IBW): 148 lbs (67 kg)    Admission Body Weight: 135 lb 8 oz (61.5 kg) (1/12 bed)  Current Body Weight: 137 lb 8 oz (62.4 kg), 92.9 % IBW. Weight Source: Bed Scale (1/14)  Current BMI (kg/m2): 21.5  Usual Body Weight: 137 lb 12.8 oz (62.5 kg) (11/10/22 OV wt at [facility] in EMR)  % Weight Change (Calculated): -0.2  Weight Adjustment For: No Adjustment                 BMI Categories: Underweight (BMI less than 22) age over 72    Estimated Daily Nutrient Needs:  Energy Requirements Based On: Kcal/kg  Weight Used for Energy Requirements: Current  Energy (kcal/day): 1336-9735  Weight Used for Protein Requirements: Current  Protein (g/day): 70-80  Method Used for Fluid Requirements: Other (Comment) (per renal/cardiology mgmt)  Fluid (ml/day):      Nutrition Diagnosis:   Inadequate protein-energy intake related to impaired respiratory function (COVID/PNA) as evidenced by poor intake prior to admission    Nutrition Interventions:   Food and/or Nutrient Delivery: Continue Current Diet (+ monitor for speech recs. Consider NPO until SLP evaluates.)  Nutrition Education/Counseling: No recommendation at this time  Coordination of Nutrition Care: Swallow Evaluation, Continue to monitor while inpatient (monitor w/speech Tx)       Goals:     Goals: Meet at least 75% of estimated needs, by next RD assessment       Nutrition Monitoring and Evaluation:      Food/Nutrient Intake Outcomes: Food and Nutrient Intake, Supplement Intake  Physical Signs/Symptoms Outcomes: Biochemical Data, Chewing or Swallowing, Fluid Status or Edema, Nutrition Focused Physical Findings, Skin, Weight    Discharge Planning:     Too soon to determine     Tiffanie Cherry RD,LD  Contact: [phone]

## 2023-01-14 NOTE — PROGRESS NOTES
Patient coughing while eating food and swallowing crushed meds. Dentures in room, patient's dentures unable to fit and fell out of mouth. Spoke to Dr. Latonia Diaz regarding patient having difficulty with eating. Order obtained to change diet and for a speech evaluation.

## 2023-01-15 NOTE — PROGRESS NOTES
Family declining use of bipap.  They want family member to be comfort care  RN sharmaine Nolasco, RCP

## 2023-01-15 NOTE — PROGRESS NOTES
Dr. Avery Arias called regarding increased respiratory distress. Respirations labored, approximately 40 per minute. Orders received. Family also called and updated about patient's decline. Two daughters at bedside, son Radha aPrrish called in [place]. Decision made to change code status to comfort measures only.  Dr. Khushbu Huynh called with update, order obtained for DNR CC.

## 2023-01-15 NOTE — PROGRESS NOTES
Physician Progress Note      Wiley Canseco  [facility] #:                  [id number]  :                       1922  ADMIT DATE:       2023 3:48 PM  Amaris Davalos DATE:        1/15/2023 7:49 AM  RESPONDING  PROVIDER #:        Dallas Gaston DO        QUERY TEXT:    Stage of Chronic Kidney Disease: Please provide further specificity, if known. Clinical indicators include: chronic kidney disease, bun, creatinine, kd, bnp  Options provided:  -- Chronic kidney disease stage 1  -- Chronic kidney disease stage 2  -- Chronic kidney disease stage 3  -- Chronic kidney disease stage 3a  -- Chronic kidney disease stage 3b  -- Chronic kidney disease stage 4  -- Chronic kidney disease stage 5  -- Chronic kidney disease stage 5, requiring dialysis  -- End stage renal disease  -- Other - I will add my own diagnosis  -- Disagree - Not applicable / Not valid  -- Disagree - Clinically Unable to determine / Unknown        PROVIDER RESPONSE TEXT:    The patient has chronic kidney disease stage 3. QUERY TEXT:    Dear Attending Physician,    Patient admitted with COVID 19 Pneumonia . Per Wound Care RN \" Stage 2   pressure injury on R buttocks. Crystal Ava Crystal Ava present on admission \" . If possible, please   document in progress notes and discharge summary the type of ulcer, site of   the ulcer and present on admission status of the ulcer: The medical record reflects the following:  Risk Factors: HTN, CHF, CKD, fatigue weakness advanced age,  Chronic wound on   L leg, Encrusted wound on R leg  Clinical Indicators: Per Wound Care RN ,\". .. Stage 2 pressure injury on R   buttocks. Crystal Ava Crystal Ava present on admission . Crystal Ava Crystal Ava \"  Treatment:  IV ATB ,  Aquaphor to buttocks, Low air loss bed, SOS precautions    Thank you,  Bruna Hoff RN CCDS  Clinical Documentation Improvement Specialist  Options provided:  -- Decubitus Pressure Ulcer to right buttocks present on admission  -- Other - I will add my own diagnosis  -- Disagree - Not applicable / Not valid  -- Disagree - Clinically unable to determine / Unknown  -- Refer to Clinical Documentation Reviewer    PROVIDER RESPONSE TEXT:    This patient has a decubitus pressure ulcer to right buttocks that was present   on admission. Query created by: Dmitry Bernabe on 1/14/2023 9:11 AM      QUERY TEXT:    Dear Attending Physician,    Patient admitted with COVID 19 Pneumonia . Pt noted to have some confusion and   lethargy per PCP and Pulmo. If possible, please document in the progress   notes and discharge summary if you are evaluating and / or treating any of the   following: The medical record reflects the following:  Risk Factors: HTN, CHF, CKD, fatigue weakness advanced age,  Chronic wound on   L leg, Encrusted wound on R leg  Clinical Indicators: Per ED,\". .. Patient is awake alert has a completely   normal mental status is oriented x3 . Fide Sharper Fide Sharper \" Per Pulmo 1/13,\". ..   awake,   conversant, oriented to place (hospital) but not time (Nov 2022). Fide Sharper Fide Sharper \"  Per   H/P,\". ..positive for COVID and his chest x-ray shows bilateral fluffy airspace   infiltrates consistent with what appears to be COVID pneumonia. ... Mental   status has changed a bit. ..seemed to be somewhat confused and a little bit   more lethargic. Fide Sharper Fide Sharper \"  Treatment:  IV ATB ,Decadron, IVF    Thank you,  Ulysses Peels RN CCDS  Clinical Documentation Improvement Specialist  Options provided:  -- Metabolic encephalopathy  -- Other - I will add my own diagnosis  -- Disagree - Not applicable / Not valid  -- Disagree - Clinically unable to determine / Unknown  -- Refer to Clinical Documentation Reviewer    PROVIDER RESPONSE TEXT:    This patient has metabolic encephalopathy.     Query created by: Dmitry Bernabe on 1/14/2023 9:17 AM      Electronically signed by:  Enrrique Zambrano DO 1/15/2023 1:50 PM

## 2024-04-23 NOTE — ED NOTES
6s confirmed receiving faxed SBAR.       Alfred Nava RN  07/10/20 6016 Detail Level: Zone Detail Level: Simple Detail Level: Detailed

## 2024-08-28 NOTE — PROGRESS NOTES
Called pt  - plan per below    -  Pump settings:  Midnight to 6 AM: 0.6  6 AM to 2 PM: 0.5  2 PM to 5 PM: 0.3  5 PM to 9 PM: 1.4  9 PM to midnight: 0.8     Correction factor:  Midnight to 6 AM: 1 per 50  6 AM to 2 PM: 1 per 45  2 PM to 5 PM: 1 per 45  5 PM to 9 PM: 1 per 45  9 PM to midnight: 1 per 50     Carb ratio:  Midnight to 6 AM: 1 per 12  6 AM to 9 PM: 1 per 10  9 PM to midnight: 1 per 12    -  Pump settings:  Midnight to 6 AM: 0.5  6 AM to 2 PM: 0.5  2 PM to 5 PM: 0.3  5 PM to 9 PM: 1.4  9 PM to midnight: 0.7     Correction factor:  Midnight to 6 AM: 1 per 50  6 AM to 2 PM: 1 per 40  2 PM to 5 PM: 1 per 40  5 PM to 9 PM: 1 per 40  9 PM to midnight: 1 per 50     Carb ratio:  Midnight to 6 AM: 1 per 12  6 AM to 9 PM: 1 per 9  9 PM to midnight: 1 per 12   nondistended, bowel sounds x 4  Ext: no edema  Skin: dry, no rash  Neuro: aaox3    MEDS (scheduled):    sodium chloride flush  10 mL Intravenous 2 times per day    aspirin EC  81 mg Oral BID    atorvastatin  40 mg Oral Daily    finasteride  5 mg Oral Nightly    isosorbide mononitrate  30 mg Oral Daily    metoprolol succinate  100 mg Oral Daily    enoxaparin  30 mg Subcutaneous Daily       MEDS (infusions):   sodium chloride 50 mL/hr at 07/15/18 0535       MEDS (prn):  nitroGLYCERIN, sodium chloride flush, acetaminophen    DATA:    Recent Labs      07/13/18   0539  07/14/18   0457  07/15/18   0607   WBC  4.4*  3.9*  5.5   HGB  10.2*  9.8*  10.6*   HCT  30.5*  30.7*  32.1*   MCV  89.7  91.4  89.7   PLT  154  150  161     Recent Labs      07/13/18   0539  07/14/18   0457  07/15/18   0607   NA  139  142  142   K  5.0  4.5  4.6   CL  103  107  109*   CO2  25  23  21*   BUN  57*  46*  33*   CREATININE  2.9*  2.2*  1.7*   LABGLOM  20  28  38   GLUCOSE  90  87  91   CALCIUM  8.5*  8.2*  8.3*   MG  2.2   --    --    PHOS  4.7*   --    --        Lab Results   Component Value Date    LABALBU 4.0 07/12/2018    LABALBU 3.0 (L) 12/04/2017    LABALBU 3.8 12/03/2017     Lab Results   Component Value Date    TSH 0.487 10/08/2012     No results found for: PHART, PO2ART, IAH4ZPA    Iron Studies: No results found for: IRON, TIBC, FERRITIN      IMPRESSION/RECOMMENDATIONS:      1) Acute Kidney Injury (Baseline scr 1.1 in Dec 2017): now elevated scr in setting of recent diarrhea and diuretic use in the home setting - now admitted w/ CP - will hold diuretics - on gentle IV fluids - strict I/Os and daily labs, renal fn improving, wt up. Will stop ivf     2) Hyperkalemia: was on lisinopril 20 BID in the home setting (now held) - repeat K+ improving      3) CP: w/ SOB - for cardiac work up     4) HTN: follow w/ acei and lasix on hold      5) Hx CA of Colon    6. Bladder abnormality seen on hannah: consult urology       Guillermo Fay.  Cheryl,
